# Patient Record
Sex: FEMALE | Race: WHITE | NOT HISPANIC OR LATINO | Employment: UNEMPLOYED | URBAN - METROPOLITAN AREA
[De-identification: names, ages, dates, MRNs, and addresses within clinical notes are randomized per-mention and may not be internally consistent; named-entity substitution may affect disease eponyms.]

---

## 2017-02-28 ENCOUNTER — NON-PROVIDER VISIT (OUTPATIENT)
Dept: URGENT CARE | Facility: CLINIC | Age: 37
End: 2017-02-28

## 2017-02-28 DIAGNOSIS — Z02.1 PRE-EMPLOYMENT DRUG SCREENING: ICD-10-CM

## 2017-02-28 LAB
AMP AMPHETAMINE: NORMAL
COC COCAINE: NORMAL
INT CON NEG: NORMAL
INT CON POS: NORMAL
OPI OPIATES: NORMAL
PCP PHENCYCLIDINE: NORMAL
POC DRUG COMMENT 753798-OCCUPATIONAL HEALTH: NEGATIVE
THC: NORMAL

## 2017-02-28 PROCEDURE — 80300 POCT 5 PANEL URINE DRUG SCREEN - INSTANT: CPT | Performed by: PHYSICIAN ASSISTANT

## 2017-12-15 DIAGNOSIS — Z01.812 PRE-OPERATIVE LABORATORY EXAMINATION: ICD-10-CM

## 2017-12-15 LAB — HCG SERPL QL: NEGATIVE

## 2017-12-15 PROCEDURE — 84703 CHORIONIC GONADOTROPIN ASSAY: CPT

## 2017-12-15 PROCEDURE — 36415 COLL VENOUS BLD VENIPUNCTURE: CPT

## 2017-12-15 RX ORDER — LAMOTRIGINE 100 MG/1
100 TABLET ORAL EVERY EVENING
COMMUNITY
End: 2018-01-30

## 2017-12-17 ENCOUNTER — HOSPITAL ENCOUNTER (OUTPATIENT)
Facility: MEDICAL CENTER | Age: 37
End: 2017-12-17
Attending: SPECIALIST | Admitting: SPECIALIST
Payer: MEDICAID

## 2017-12-17 VITALS
RESPIRATION RATE: 16 BRPM | OXYGEN SATURATION: 99 % | WEIGHT: 172.18 LBS | TEMPERATURE: 97.7 F | BODY MASS INDEX: 29.4 KG/M2 | HEIGHT: 64 IN | DIASTOLIC BLOOD PRESSURE: 75 MMHG | HEART RATE: 61 BPM | SYSTOLIC BLOOD PRESSURE: 120 MMHG

## 2017-12-17 LAB
HCG UR QL: NEGATIVE
SP GR UR REFRACTOMETRY: 1.02

## 2017-12-17 PROCEDURE — 160048 HCHG OR STATISTICAL LEVEL 1-5: Performed by: SPECIALIST

## 2017-12-17 PROCEDURE — 160009 HCHG ANES TIME/MIN: Performed by: SPECIALIST

## 2017-12-17 PROCEDURE — 500129 HCHG BOVIE, NEEDLE TIP 6 EXTEN: Performed by: SPECIALIST

## 2017-12-17 PROCEDURE — A9270 NON-COVERED ITEM OR SERVICE: HCPCS

## 2017-12-17 PROCEDURE — 81025 URINE PREGNANCY TEST: CPT

## 2017-12-17 PROCEDURE — 700102 HCHG RX REV CODE 250 W/ 637 OVERRIDE(OP)

## 2017-12-17 PROCEDURE — 160035 HCHG PACU - 1ST 60 MINS PHASE I: Performed by: SPECIALIST

## 2017-12-17 PROCEDURE — 502587 HCHG PACK, D&C: Performed by: SPECIALIST

## 2017-12-17 PROCEDURE — 160046 HCHG PACU - 1ST 60 MINS PHASE II: Performed by: SPECIALIST

## 2017-12-17 PROCEDURE — 160039 HCHG SURGERY MINUTES - EA ADDL 1 MIN LEVEL 3: Performed by: SPECIALIST

## 2017-12-17 PROCEDURE — 160025 RECOVERY II MINUTES (STATS): Performed by: SPECIALIST

## 2017-12-17 PROCEDURE — 500448 HCHG DRESSING, TELFA 3X4: Performed by: SPECIALIST

## 2017-12-17 PROCEDURE — 160028 HCHG SURGERY MINUTES - 1ST 30 MINS LEVEL 3: Performed by: SPECIALIST

## 2017-12-17 PROCEDURE — 88305 TISSUE EXAM BY PATHOLOGIST: CPT

## 2017-12-17 PROCEDURE — 700111 HCHG RX REV CODE 636 W/ 250 OVERRIDE (IP)

## 2017-12-17 PROCEDURE — 500123 HCHG BOVIE, CONTROL W/BLADE: Performed by: SPECIALIST

## 2017-12-17 PROCEDURE — 160002 HCHG RECOVERY MINUTES (STAT): Performed by: SPECIALIST

## 2017-12-17 PROCEDURE — 700101 HCHG RX REV CODE 250

## 2017-12-17 RX ORDER — ONDANSETRON 2 MG/ML
4 INJECTION INTRAMUSCULAR; INTRAVENOUS EVERY 6 HOURS PRN
Status: DISCONTINUED | OUTPATIENT
Start: 2017-12-17 | End: 2017-12-17 | Stop reason: HOSPADM

## 2017-12-17 RX ORDER — LIDOCAINE AND PRILOCAINE 25; 25 MG/G; MG/G
1 CREAM TOPICAL
Status: DISCONTINUED | OUTPATIENT
Start: 2017-12-17 | End: 2017-12-17 | Stop reason: HOSPADM

## 2017-12-17 RX ORDER — OXYCODONE HYDROCHLORIDE AND ACETAMINOPHEN 5; 325 MG/1; MG/1
1 TABLET ORAL EVERY 6 HOURS PRN
Status: DISCONTINUED | OUTPATIENT
Start: 2017-12-17 | End: 2017-12-17 | Stop reason: HOSPADM

## 2017-12-17 RX ORDER — SODIUM CHLORIDE, SODIUM LACTATE, POTASSIUM CHLORIDE, CALCIUM CHLORIDE 600; 310; 30; 20 MG/100ML; MG/100ML; MG/100ML; MG/100ML
INJECTION, SOLUTION INTRAVENOUS CONTINUOUS
Status: DISCONTINUED | OUTPATIENT
Start: 2017-12-17 | End: 2017-12-17 | Stop reason: HOSPADM

## 2017-12-17 RX ORDER — OXYCODONE HCL 5 MG/5 ML
SOLUTION, ORAL ORAL
Status: COMPLETED
Start: 2017-12-17 | End: 2017-12-17

## 2017-12-17 RX ORDER — LIDOCAINE HYDROCHLORIDE 10 MG/ML
0.5 INJECTION, SOLUTION INFILTRATION; PERINEURAL
Status: DISCONTINUED | OUTPATIENT
Start: 2017-12-17 | End: 2017-12-17 | Stop reason: HOSPADM

## 2017-12-17 RX ADMIN — SODIUM CHLORIDE, SODIUM LACTATE, POTASSIUM CHLORIDE, CALCIUM CHLORIDE: 600; 310; 30; 20 INJECTION, SOLUTION INTRAVENOUS at 12:42

## 2017-12-17 RX ADMIN — OXYCODONE HYDROCHLORIDE 10 MG: 5 SOLUTION ORAL at 15:30

## 2017-12-17 ASSESSMENT — PAIN SCALES - GENERAL
PAINLEVEL_OUTOF10: 0

## 2017-12-17 NOTE — OR NURSING
Some uncomfortable cramping. Respirations regular and easy. Scant vag discharge pink. Voided on bed pan. Very anxious.

## 2017-12-17 NOTE — DISCHARGE INSTRUCTIONS
ACTIVITY: Rest and take it easy for the first 24 hours.  A responsible adult is recommended to remain with you during that time.  It is normal to feel sleepy.  We encourage you to not do anything that requires balance, judgment or coordination.    MILD FLU-LIKE SYMPTOMS ARE NORMAL. YOU MAY EXPERIENCE GENERALIZED MUSCLE ACHES, THROAT IRRITATION, HEADACHE AND/OR SOME NAUSEA.    FOR 24 HOURS DO NOT:  Drive, operate machinery or run household appliances.  Drink beer or alcoholic beverages.   Make important decisions or sign legal documents.    SPECIAL INSTRUCTIONS: *follow up with Dr. Keating in 2 weeks.    DIET: To avoid nausea, slowly advance diet as tolerated, avoiding spicy or greasy foods for the first day.  Add more substantial food to your diet according to your physician's instructions.  Babies can be fed formula or breast milk as soon as they are hungry.  INCREASE FLUIDS AND FIBER TO AVOID CONSTIPATION.    SURGICAL DRESSING/BATHING: **no dressing.  May shower beginning tomorrow.    FOLLOW-UP APPOINTMENT:  A follow-up appointment should be arranged with your doctor.  Call to schedule.    You should CALL YOUR PHYSICIAN if you develop:  Fever greater than 101 degrees F.  Pain not relieved by medication, or persistent nausea or vomiting.  Excessive bleeding (blood soaking through dressing) or unexpected drainage from the wound.  Extreme redness or swelling around the incision site, drainage of pus or foul smelling drainage.  Inability to urinate or empty your bladder within 8 hours.  Problems with breathing or chest pain.    You should call 911 if you develop problems with breathing or chest pain.  If you are unable to contact your doctor or surgical center, you should go to the nearest emergency room or urgent care center.  Physician's telephone #: **Dr. Keating [phone]    If any questions arise, call your doctor.  If your doctor is not available, please feel free to call the Surgical Center at [phone].   The Center is open Monday through Friday from 7AM to 7PM.  You can also call the HEALTH HOTLINE open 24 hours/day, 7 days/week and speak to a nurse at [phone], or toll free at [phone].    A registered nurse may call you a few days after your surgery to see how you are doing after your procedure.    MEDICATIONS: Resume taking daily medication.  Take prescribed pain medication with food.  If no medication is prescribed, you may take non-aspirin pain medication if needed.  PAIN MEDICATION CAN BE VERY CONSTIPATING.  Take a stool softener or laxative such as senokot, pericolace, or milk of magnesia if needed.    No prescription given.  Last pain medication given at 3:30 pm.    If your physician has prescribed pain medication that includes Acetaminophen (Tylenol), do not take additional Acetaminophen (Tylenol) while taking the prescribed medication.    Depression / Suicide Risk    As you are discharged from this [facility] facility, it is important to learn how to keep safe from harming yourself.    Recognize the warning signs:  · Abrupt changes in personality, positive or negative- including increase in energy   · Giving away possessions  · Change in eating patterns- significant weight changes-  positive or negative  · Change in sleeping patterns- unable to sleep or sleeping all the time   · Unwillingness or inability to communicate  · Depression  · Unusual sadness, discouragement and loneliness  · Talk of wanting to die  · Neglect of personal appearance   · Rebelliousness- reckless behavior  · Withdrawal from people/activities they love  · Confusion- inability to concentrate     If you or a loved one observes any of these behaviors or has concerns about self-harm, here's what you can do:  · Talk about it- your feelings and reasons for harming yourself  · Remove any means that you might use to hurt yourself (examples: pills, rope, extension cords, firearm)  · Get professional help from the community  (Mental Health, Substance Abuse, psychological counseling)  · Do not be alone:Call your Safe Contact- someone whom you trust who will be there for you.  · Call your local CRISIS HOTLINE 711-6288 or 822-715-7378  · Call your local Children's Mobile Crisis Response Team [place] (525) 684-3685 or www.[employer]  · Call the toll free National Suicide Prevention Hotlines   · National Suicide Prevention Lifeline 385-736-CGFZ (0518)  · National Hope Line Network 800-SUICIDE (158-7751)

## 2017-12-17 NOTE — H&P
Korina Lyons          YOB: 1980  Date of today's procedure: Sunday, December 17, 2017  Facility: [facility]    ID: The patient is a very pleasant 37 year old multipara (para-2, and she has had 2 previous vaginal deliveries, and her daughters are 13 years old and 9 years old).    Chief Complaint: The patient has had complaints of abundant mucus vaginal discharge for months.    History of Present Illness: The patient came to see me in the office on December 12, 2017, 5 days ago.  This was her first and only visit with me.  She was referred to me by Dr. Kay Kaufman at [facility].  According to the records made available to me she was referred to me because of an abnormal Pap smear from June of this year and records made available to me reveal that her Pap smear in June of this year came back is atypical squamous cells of undetermined significance and that the report went on to say that the test for high-risk HPV was positive for evidence of high-risk HPV.  And so, when I saw her in the office on December 12, 2017 I attempted to perform a colposcopy.  However, I was not able to perform a colposcopy because (as speculum exam and bimanual exam revealed) a large (approximately 5 centimeters in diameter) mass in and arising from the 6 o’clock position of the cervix distorted the anatomy of the cervix and precluded colposcopy.  The speculum was removed and then I performed a very thorough bimanual vaginal exam and was able to palpate this discrete firm mass in and arising from the 6 o’clock position of the cervix.  There is concern (given the finding of this mass and given her symptoms of mucus vaginal discharge for several months and also given her symptoms of postcoital bleeding) of the possibility of cervical carcinoma.  And so she is today scheduled for biopsy of this cervical mass.  I have discussed with her and asked me to her in detail and at length what  biopsy of cervical mass in the operating room under anesthesia is and what biopsy of cervical mass in the operating room under general anesthesia involves and I have discussed with her and explained to her in detail and at length the risks and benefits and alternatives of biopsy of cervical mass in the operating room under general anesthesia.  After our discussions and after answering her questions she told me that she very much wishes for us to proceed with biopsy of cervical mass in the operating room under general anesthesia.    Past Medical History: The patient tells me that she has no medical illnesses.    Past Surgical History: The patient says that she has had no previous surgeries.    Medications: The patient says that she takes lamotrigine 100 milligrams daily.    Allergies: The patient says that she has no known drug allergies.    Social History: The patient tells me that she smokes about one half of a pack of cigarettes per day.  She says she only really consumes alcoholic beverages.  She denies the use of recreational drugs.    Review of Systems  General: The patient denies any fevers, chills, sweats.  Pulmonary: The patient denies any coughing, wheezing, chest pain, shortness of breath.  Cardiovascular: The patient denies any palpitations, dyspnea, chest pain.  Gastrointestinal: The patient denies any nausea, vomiting, diarrhea, constipation, hematochezia, melena, history of hepatitis, history of jaundice.  Genitourinary: The patient complains of mucus vaginal discharge for several months and also complains of postcoital bleeding.  She says that her menses are regular and that the amount of blood flow that she experiences with her menses is moderate.  She says she does experience some dyspareunia.  She says she also has some pelvic pain on related to menstruation.  Musculoskeletal: The patient denies any arthralgias or myalgias.   Neurological: No headaches or syncope or seizures.     Physical Exam:    Vital Signs: The patient's vital signs are stable and she is afebrile.  General: The patient appears well developed and well nourished and relaxed and alert and comfortable and in no apparent distress.    HEENT :  Normo-cephalic, atraumatic, pupils equal, round, reactive to light and accommodation, extra ocular motions intact, pharynx clear; there is no thyromegaly. There is no cervical lymphadenopathy.  Chest: Heart regular rate and rhythm, with no murmurs or rubs or gallops; the lungs are clear to auscultation bilaterally.  Abdomen: The abdomen is soft and flat and non-tender and non-distended. There is no hepatomegaly. There is no splenomegaly.   Pelvic: Speculum exam reveals a fairly large mass in and arising from the 6 o’clock position of the cervix.  The rest of the cervix appears normal.  There are no vulvar or vaginal lesions.  Bimanual exam does reveal a very firm mass in and arising from the 6 o’clock position of the cervix.  Bimanual exam reveals no evidence of enlargement of the uterine corpus and no evidence of tenderness to palpation of the uterine corpus and no evidence of any adnexal masses or tenderness either on the right or the left.  Extremities: No clubbing or cyanosis or edema.   Neurological: non-focal.     Assessment:   1.)  Abnormal Pap smear (atypical squamous of undetermined significance and the report went on to say that the test for high-risk HPV was positive for high-risk HPV) in June of this year.  2.)  Fairly large (5 centimeters in diameter) cervical mass in and arising from the 6 o’clock position of the cervix; her abnormal Pap smear and the finding of this mass in her symptoms of mucus vaginal discharge for several months of postcoital bleeding raises the suspicion of cervical carcinoma.    Plan:   We will proceed today with biopsy of cervical mass.  Please see above.          ________________________  Denis Keating M.D.

## 2017-12-23 NOTE — OP REPORT
DATE OF SERVICE:  12/17/2017    PREOPERATIVE DIAGNOSES:  1.  Abnormal Pap smear (atypical squamous cells of undetermined significance   and the report went on to say that the test for high risk HPV was positive for   high risk HPV) in June of this year.  2.  Fairly large (5 cm in diameter) cervical mass in and arising from the 6   o'clock position of the cervix; her abnormal Pap smear and the finding of this   mass and her symptoms of mucus, vaginal discharge for several months and her   symptoms of postcoital bleeding as well, raised the suspicion of cervical   carcinoma.    POSTOPERATIVE DIAGNOSES:  1.  Abnormal Pap smear (atypical squamous cells of undetermined significance   and the report went on to say that the test for high risk HPV was positive for   high risk HPV) in June of this year.  2.  Fairly large (5 cm in diameter) cervical mass in and arising from the 6   o'clock position of the cervix; her abnormal Pap smear and the finding of this   mass and her symptoms of mucus, vaginal discharge for several months and her   symptoms of postcoital bleeding as well, raised the suspicion of cervical   carcinoma.  3.  Pathology pending.    PROCEDURE:  Biopsy of cervical mass.    SURGEON:  Denis Keating MD    ANESTHESIA:  General.    ANESTHESIOLOGIST:  Fernando Rebolledo MD    FINDINGS:  Both speculum exam under anesthesia and also bimanual exam under   anesthesia revealed a cervical mass located in and coming from the 6 o'clock   position of the cervix.    SPECIMENS:  Biopsy of cervical mass.    COMPLICATIONS:  None.    ESTIMATED BLOOD LOSS:  Less than 30 mL.    DESCRIPTION OF PROCEDURE:  After appropriate consents have been obtained,   patient was taken to the operating room and given general anesthesia.  She was   prepped and draped in the dorsal lithotomy position.  After general   anesthesia was induced, but before the patient was prepped and draped in the   dorsal lithotomy position, a bimanual vaginal exam  was performed and reveals a   large, approximately 5 cm mass in and arising from the 6 o'clock position of   the cervix.  Bimanual exam reveals no evidence of enlargement of the uterine   corpus and no evidence of adnexal masses either on the right or the left.    After the patient was prepped and draped in the dorsal lithotomy position and   after the bladder was emptied, a speculum exam was performed and reveals a   large cervical mass in and coming from the 6 o'clock position of the cervix.    This mass was carefully examined and inspected.  The central portion of this   mass is grasped with a single tooth tenaculum and this area of this mass was   resected using a scalpel with a 10 blade.  The biopsy measures about 1 cm in   any/old dimensions.  The area of the cervical mass from which this biopsy was   taken was examined and bleeding was seen coming from this area and the entire   area was thoroughly cauterized with electrocautery.  Still some bleeding has   seen from this area and pressure was placed on this area for 5 minutes with a   gauze sponge.  The gauze sponge was removed and this area was examined, still   some bleeding seen coming from this area and further cauterization was   performed.  Still some bleeding was seen and pressure then was again placed   with a scopette covered with Monsel solution.  Pressure was placed for about 5   minutes.  The scopette with Monsel solution was then removed and the area was   examined and bleeding was found to have substantially decreased, but there   was still a small amount of bleeding.  Another scopette covered with more   Monsel's solution was applied to this area and pressure was placed again   against this area for another 5 minutes.  The scopette was removed and the   area was examined and no significant bleeding was seen at this time.  The   speculum was removed.  The procedure was terminated.  The patient tolerated   the procedure well and sent to  postanesthesia recovery in stable condition and   the procedure was terminated with lap counts reported to be correct at the   end of procedure.       ____________________________________     VITOR KAY MD    MED / NTS    DD:  12/23/2017 08:33:03  DT:  12/23/2017 09:08:07    D#:  [id number]  Job#:  [id number]    cc: JACQUIE MADERA MD

## 2018-01-16 ENCOUNTER — HOSPITAL ENCOUNTER (OUTPATIENT)
Dept: RADIOLOGY | Facility: MEDICAL CENTER | Age: 38
End: 2018-01-16
Attending: SPECIALIST
Payer: MEDICAID

## 2018-01-16 DIAGNOSIS — C53.9 MALIGNANT NEOPLASM OF CERVIX, UNSPECIFIED SITE (HCC): ICD-10-CM

## 2018-01-16 PROCEDURE — A9552 F18 FDG: HCPCS

## 2018-01-30 ENCOUNTER — HOSPITAL ENCOUNTER (OUTPATIENT)
Dept: RADIOLOGY | Facility: MEDICAL CENTER | Age: 38
DRG: 735 | End: 2018-01-30
Attending: SPECIALIST | Admitting: SPECIALIST
Payer: MEDICAID

## 2018-01-30 DIAGNOSIS — Z01.811 PRE-OPERATIVE RESPIRATORY EXAMINATION: ICD-10-CM

## 2018-01-30 DIAGNOSIS — Z01.812 PRE-PROCEDURAL LABORATORY EXAMINATION: ICD-10-CM

## 2018-01-30 LAB
ABO GROUP BLD: NORMAL
ALBUMIN SERPL BCP-MCNC: 4.7 G/DL (ref 3.2–4.9)
ALBUMIN/GLOB SERPL: 1.7 G/DL
ALP SERPL-CCNC: 61 U/L (ref 30–99)
ALT SERPL-CCNC: 13 U/L (ref 2–50)
ANION GAP SERPL CALC-SCNC: 8 MMOL/L (ref 0–11.9)
APTT PPP: 30.5 SEC (ref 24.7–36)
AST SERPL-CCNC: 22 U/L (ref 12–45)
BASOPHILS # BLD AUTO: 0.4 % (ref 0–1.8)
BASOPHILS # BLD: 0.04 K/UL (ref 0–0.12)
BILIRUB SERPL-MCNC: 0.7 MG/DL (ref 0.1–1.5)
BLD GP AB SCN SERPL QL: NORMAL
BUN SERPL-MCNC: 17 MG/DL (ref 8–22)
CALCIUM SERPL-MCNC: 9.1 MG/DL (ref 8.5–10.5)
CHLORIDE SERPL-SCNC: 103 MMOL/L (ref 96–112)
CO2 SERPL-SCNC: 24 MMOL/L (ref 20–33)
CREAT SERPL-MCNC: 0.79 MG/DL (ref 0.5–1.4)
EOSINOPHIL # BLD AUTO: 0.09 K/UL (ref 0–0.51)
EOSINOPHIL NFR BLD: 0.9 % (ref 0–6.9)
ERYTHROCYTE [DISTWIDTH] IN BLOOD BY AUTOMATED COUNT: 43.8 FL (ref 35.9–50)
GLOBULIN SER CALC-MCNC: 2.7 G/DL (ref 1.9–3.5)
GLUCOSE SERPL-MCNC: 74 MG/DL (ref 65–99)
HCG SERPL QL: NEGATIVE
HCT VFR BLD AUTO: 46.1 % (ref 37–47)
HGB BLD-MCNC: 15.7 G/DL (ref 12–16)
IMM GRANULOCYTES # BLD AUTO: 0.04 K/UL (ref 0–0.11)
IMM GRANULOCYTES NFR BLD AUTO: 0.4 % (ref 0–0.9)
INR PPP: 0.98 (ref 0.87–1.13)
LYMPHOCYTES # BLD AUTO: 2 K/UL (ref 1–4.8)
LYMPHOCYTES NFR BLD: 20 % (ref 22–41)
MCH RBC QN AUTO: 32.6 PG (ref 27–33)
MCHC RBC AUTO-ENTMCNC: 34.1 G/DL (ref 33.6–35)
MCV RBC AUTO: 95.8 FL (ref 81.4–97.8)
MONOCYTES # BLD AUTO: 0.94 K/UL (ref 0–0.85)
MONOCYTES NFR BLD AUTO: 9.4 % (ref 0–13.4)
NEUTROPHILS # BLD AUTO: 6.9 K/UL (ref 2–7.15)
NEUTROPHILS NFR BLD: 68.9 % (ref 44–72)
NRBC # BLD AUTO: 0 K/UL
NRBC BLD-RTO: 0 /100 WBC
PLATELET # BLD AUTO: 325 K/UL (ref 164–446)
PMV BLD AUTO: 9.4 FL (ref 9–12.9)
POTASSIUM SERPL-SCNC: 3.8 MMOL/L (ref 3.6–5.5)
PROT SERPL-MCNC: 7.4 G/DL (ref 6–8.2)
PROTHROMBIN TIME: 12.7 SEC (ref 12–14.6)
RBC # BLD AUTO: 4.81 M/UL (ref 4.2–5.4)
RH BLD: NORMAL
SODIUM SERPL-SCNC: 135 MMOL/L (ref 135–145)
WBC # BLD AUTO: 10 K/UL (ref 4.8–10.8)

## 2018-01-30 PROCEDURE — 85730 THROMBOPLASTIN TIME PARTIAL: CPT

## 2018-01-30 PROCEDURE — 85610 PROTHROMBIN TIME: CPT

## 2018-01-30 PROCEDURE — 86850 RBC ANTIBODY SCREEN: CPT

## 2018-01-30 PROCEDURE — 80053 COMPREHEN METABOLIC PANEL: CPT

## 2018-01-30 PROCEDURE — 36415 COLL VENOUS BLD VENIPUNCTURE: CPT

## 2018-01-30 PROCEDURE — 86900 BLOOD TYPING SEROLOGIC ABO: CPT

## 2018-01-30 PROCEDURE — 85025 COMPLETE CBC W/AUTO DIFF WBC: CPT

## 2018-01-30 PROCEDURE — 71046 X-RAY EXAM CHEST 2 VIEWS: CPT

## 2018-01-30 PROCEDURE — 86901 BLOOD TYPING SEROLOGIC RH(D): CPT

## 2018-01-30 PROCEDURE — 84703 CHORIONIC GONADOTROPIN ASSAY: CPT

## 2018-02-01 ENCOUNTER — HOSPITAL ENCOUNTER (INPATIENT)
Facility: MEDICAL CENTER | Age: 38
LOS: 1 days | DRG: 735 | End: 2018-02-02
Attending: SPECIALIST | Admitting: SPECIALIST
Payer: MEDICAID

## 2018-02-01 ENCOUNTER — APPOINTMENT (OUTPATIENT)
Dept: RADIOLOGY | Facility: MEDICAL CENTER | Age: 38
DRG: 735 | End: 2018-02-01
Attending: OBSTETRICS & GYNECOLOGY
Payer: MEDICAID

## 2018-02-01 ENCOUNTER — APPOINTMENT (OUTPATIENT)
Dept: RADIOLOGY | Facility: MEDICAL CENTER | Age: 38
DRG: 735 | End: 2018-02-01
Attending: SPECIALIST
Payer: MEDICAID

## 2018-02-01 DIAGNOSIS — G89.18 POST-OP PAIN: ICD-10-CM

## 2018-02-01 DIAGNOSIS — G89.18 POST-OPERATIVE PAIN: ICD-10-CM

## 2018-02-01 LAB
ANION GAP SERPL CALC-SCNC: 6 MMOL/L (ref 0–11.9)
BASOPHILS # BLD AUTO: 0.3 % (ref 0–1.8)
BASOPHILS # BLD: 0.06 K/UL (ref 0–0.12)
BUN SERPL-MCNC: 13 MG/DL (ref 8–22)
CALCIUM SERPL-MCNC: 7.3 MG/DL (ref 8.5–10.5)
CHLORIDE SERPL-SCNC: 107 MMOL/L (ref 96–112)
CO2 SERPL-SCNC: 21 MMOL/L (ref 20–33)
CREAT SERPL-MCNC: 0.86 MG/DL (ref 0.5–1.4)
EOSINOPHIL # BLD AUTO: 0 K/UL (ref 0–0.51)
EOSINOPHIL NFR BLD: 0 % (ref 0–6.9)
ERYTHROCYTE [DISTWIDTH] IN BLOOD BY AUTOMATED COUNT: 44 FL (ref 35.9–50)
GLUCOSE SERPL-MCNC: 129 MG/DL (ref 65–99)
HCT VFR BLD AUTO: 39.4 % (ref 37–47)
HGB BLD-MCNC: 12.6 G/DL (ref 12–16)
IMM GRANULOCYTES # BLD AUTO: 0.1 K/UL (ref 0–0.11)
IMM GRANULOCYTES NFR BLD AUTO: 0.5 % (ref 0–0.9)
LYMPHOCYTES # BLD AUTO: 0.45 K/UL (ref 1–4.8)
LYMPHOCYTES NFR BLD: 2.1 % (ref 22–41)
MCH RBC QN AUTO: 31.3 PG (ref 27–33)
MCHC RBC AUTO-ENTMCNC: 32 G/DL (ref 33.6–35)
MCV RBC AUTO: 98 FL (ref 81.4–97.8)
MONOCYTES # BLD AUTO: 0.97 K/UL (ref 0–0.85)
MONOCYTES NFR BLD AUTO: 4.5 % (ref 0–13.4)
NEUTROPHILS # BLD AUTO: 20.19 K/UL (ref 2–7.15)
NEUTROPHILS NFR BLD: 92.6 % (ref 44–72)
NRBC # BLD AUTO: 0 K/UL
NRBC BLD-RTO: 0 /100 WBC
PLATELET # BLD AUTO: 242 K/UL (ref 164–446)
PMV BLD AUTO: 8.6 FL (ref 9–12.9)
POTASSIUM SERPL-SCNC: 4.1 MMOL/L (ref 3.6–5.5)
RBC # BLD AUTO: 4.02 M/UL (ref 4.2–5.4)
SODIUM SERPL-SCNC: 134 MMOL/L (ref 135–145)
WBC # BLD AUTO: 21.8 K/UL (ref 4.8–10.8)

## 2018-02-01 PROCEDURE — 88309 TISSUE EXAM BY PATHOLOGIST: CPT

## 2018-02-01 PROCEDURE — A9270 NON-COVERED ITEM OR SERVICE: HCPCS

## 2018-02-01 PROCEDURE — 502240 HCHG MISC OR SUPPLY RC 0272: Performed by: SPECIALIST

## 2018-02-01 PROCEDURE — C2617 STENT, NON-COR, TEM W/O DEL: HCPCS | Performed by: SPECIALIST

## 2018-02-01 PROCEDURE — 500854 HCHG NEEDLE, INSUFFLATION FOR STEP: Performed by: SPECIALIST

## 2018-02-01 PROCEDURE — 85025 COMPLETE CBC W/AUTO DIFF WBC: CPT

## 2018-02-01 PROCEDURE — 160009 HCHG ANES TIME/MIN: Performed by: SPECIALIST

## 2018-02-01 PROCEDURE — 88307 TISSUE EXAM BY PATHOLOGIST: CPT

## 2018-02-01 PROCEDURE — 700101 HCHG RX REV CODE 250

## 2018-02-01 PROCEDURE — C1769 GUIDE WIRE: HCPCS | Performed by: SPECIALIST

## 2018-02-01 PROCEDURE — 88331 PATH CONSLTJ SURG 1 BLK 1SPC: CPT

## 2018-02-01 PROCEDURE — 36415 COLL VENOUS BLD VENIPUNCTURE: CPT

## 2018-02-01 PROCEDURE — 160036 HCHG PACU - EA ADDL 30 MINS PHASE I: Performed by: SPECIALIST

## 2018-02-01 PROCEDURE — 500301 HCHG CLIP, HEMOLOCK: Performed by: SPECIALIST

## 2018-02-01 PROCEDURE — 88332 PATH CONSLTJ SURG EA ADD BLK: CPT

## 2018-02-01 PROCEDURE — 502714 HCHG ROBOTIC SURGERY SERVICES: Performed by: SPECIALIST

## 2018-02-01 PROCEDURE — 770006 HCHG ROOM/CARE - MED/SURG/GYN SEMI*

## 2018-02-01 PROCEDURE — 700111 HCHG RX REV CODE 636 W/ 250 OVERRIDE (IP)

## 2018-02-01 PROCEDURE — 700102 HCHG RX REV CODE 250 W/ 637 OVERRIDE(OP): Performed by: OBSTETRICS & GYNECOLOGY

## 2018-02-01 PROCEDURE — 160031 HCHG SURGERY MINUTES - 1ST 30 MINS LEVEL 5: Performed by: SPECIALIST

## 2018-02-01 PROCEDURE — 501582 HCHG TROCAR, THRD BLADED: Performed by: SPECIALIST

## 2018-02-01 PROCEDURE — 74018 RADEX ABDOMEN 1 VIEW: CPT

## 2018-02-01 PROCEDURE — 0T788DZ DILATION OF BILATERAL URETERS WITH INTRALUMINAL DEVICE, VIA NATURAL OR ARTIFICIAL OPENING ENDOSCOPIC: ICD-10-PCS | Performed by: SPECIALIST

## 2018-02-01 PROCEDURE — 500864 HCHG NEEDLE, SPINAL 18G: Performed by: SPECIALIST

## 2018-02-01 PROCEDURE — 500700 HCHG HEMOCLIP, SMALL (RED): Performed by: SPECIALIST

## 2018-02-01 PROCEDURE — 700101 HCHG RX REV CODE 250: Performed by: OBSTETRICS & GYNECOLOGY

## 2018-02-01 PROCEDURE — 500880 HCHG PACK, CYSTO W/SEP LEGGINGS: Performed by: SPECIALIST

## 2018-02-01 PROCEDURE — 700101 HCHG RX REV CODE 250: Performed by: SPECIALIST

## 2018-02-01 PROCEDURE — 0US24ZZ REPOSITION BILATERAL OVARIES, PERCUTANEOUS ENDOSCOPIC APPROACH: ICD-10-PCS | Performed by: SPECIALIST

## 2018-02-01 PROCEDURE — 160042 HCHG SURGERY MINUTES - EA ADDL 1 MIN LEVEL 5: Performed by: SPECIALIST

## 2018-02-01 PROCEDURE — 07TC4ZZ RESECTION OF PELVIS LYMPHATIC, PERCUTANEOUS ENDOSCOPIC APPROACH: ICD-10-PCS | Performed by: SPECIALIST

## 2018-02-01 PROCEDURE — 160048 HCHG OR STATISTICAL LEVEL 1-5: Performed by: SPECIALIST

## 2018-02-01 PROCEDURE — 0UT9FZZ RESECTION OF UTERUS, VIA NATURAL OR ARTIFICIAL OPENING WITH PERCUTANEOUS ENDOSCOPIC ASSISTANCE: ICD-10-PCS | Performed by: SPECIALIST

## 2018-02-01 PROCEDURE — A9270 NON-COVERED ITEM OR SERVICE: HCPCS | Performed by: OBSTETRICS & GYNECOLOGY

## 2018-02-01 PROCEDURE — 0UT7FZZ RESECTION OF BILATERAL FALLOPIAN TUBES, VIA NATURAL OR ARTIFICIAL OPENING WITH PERCUTANEOUS ENDOSCOPIC ASSISTANCE: ICD-10-PCS | Performed by: SPECIALIST

## 2018-02-01 PROCEDURE — 80048 BASIC METABOLIC PNL TOTAL CA: CPT

## 2018-02-01 PROCEDURE — 160002 HCHG RECOVERY MINUTES (STAT): Performed by: SPECIALIST

## 2018-02-01 PROCEDURE — 160035 HCHG PACU - 1ST 60 MINS PHASE I: Performed by: SPECIALIST

## 2018-02-01 PROCEDURE — 700111 HCHG RX REV CODE 636 W/ 250 OVERRIDE (IP): Performed by: OBSTETRICS & GYNECOLOGY

## 2018-02-01 PROCEDURE — 501838 HCHG SUTURE GENERAL: Performed by: SPECIALIST

## 2018-02-01 PROCEDURE — 700102 HCHG RX REV CODE 250 W/ 637 OVERRIDE(OP)

## 2018-02-01 PROCEDURE — 501330 HCHG SET, CYSTO IRRIG TUBING: Performed by: SPECIALIST

## 2018-02-01 PROCEDURE — 502779 HCHG SUTURE, QUILL: Performed by: SPECIALIST

## 2018-02-01 PROCEDURE — 8E0W4CZ ROBOTIC ASSISTED PROCEDURE OF TRUNK REGION, PERCUTANEOUS ENDOSCOPIC APPROACH: ICD-10-PCS | Performed by: SPECIALIST

## 2018-02-01 DEVICE — STENT UROLOGICAL POLARIS 6X22  ULTRA: Type: IMPLANTABLE DEVICE | Status: FUNCTIONAL

## 2018-02-01 RX ORDER — ONDANSETRON 4 MG/1
4 TABLET, ORALLY DISINTEGRATING ORAL EVERY 4 HOURS PRN
Status: DISCONTINUED | OUTPATIENT
Start: 2018-02-01 | End: 2018-02-02 | Stop reason: HOSPADM

## 2018-02-01 RX ORDER — SODIUM CHLORIDE, SODIUM LACTATE, POTASSIUM CHLORIDE, CALCIUM CHLORIDE 600; 310; 30; 20 MG/100ML; MG/100ML; MG/100ML; MG/100ML
2000 INJECTION, SOLUTION INTRAVENOUS CONTINUOUS
Status: DISCONTINUED | OUTPATIENT
Start: 2018-02-01 | End: 2018-02-02 | Stop reason: HOSPADM

## 2018-02-01 RX ORDER — MIDAZOLAM HYDROCHLORIDE 1 MG/ML
INJECTION INTRAMUSCULAR; INTRAVENOUS
Status: DISPENSED
Start: 2018-02-01 | End: 2018-02-01

## 2018-02-01 RX ORDER — DEXTROSE MONOHYDRATE, SODIUM CHLORIDE, AND POTASSIUM CHLORIDE 50; 1.49; 4.5 G/1000ML; G/1000ML; G/1000ML
INJECTION, SOLUTION INTRAVENOUS
Status: COMPLETED
Start: 2018-02-01 | End: 2018-02-01

## 2018-02-01 RX ORDER — ACETAMINOPHEN 500 MG
TABLET ORAL
Status: DISPENSED
Start: 2018-02-01 | End: 2018-02-01

## 2018-02-01 RX ORDER — LAMOTRIGINE 100 MG/1
100 TABLET ORAL DAILY
COMMUNITY
End: 2018-02-11

## 2018-02-01 RX ORDER — ONDANSETRON 2 MG/ML
4 INJECTION INTRAMUSCULAR; INTRAVENOUS EVERY 6 HOURS PRN
Status: DISCONTINUED | OUTPATIENT
Start: 2018-02-01 | End: 2018-02-02 | Stop reason: HOSPADM

## 2018-02-01 RX ORDER — CELECOXIB 200 MG/1
CAPSULE ORAL
Status: DISPENSED
Start: 2018-02-01 | End: 2018-02-01

## 2018-02-01 RX ORDER — LIDOCAINE HYDROCHLORIDE 10 MG/ML
0.5 INJECTION, SOLUTION INFILTRATION; PERINEURAL
Status: ACTIVE | OUTPATIENT
Start: 2018-02-01 | End: 2018-02-02

## 2018-02-01 RX ORDER — DEXTROSE MONOHYDRATE, SODIUM CHLORIDE, AND POTASSIUM CHLORIDE 50; 1.49; 4.5 G/1000ML; G/1000ML; G/1000ML
INJECTION, SOLUTION INTRAVENOUS CONTINUOUS
Status: DISCONTINUED | OUTPATIENT
Start: 2018-02-01 | End: 2018-02-02 | Stop reason: HOSPADM

## 2018-02-01 RX ORDER — GABAPENTIN 300 MG/1
CAPSULE ORAL
Status: DISPENSED
Start: 2018-02-01 | End: 2018-02-01

## 2018-02-01 RX ORDER — BUPIVACAINE HYDROCHLORIDE AND EPINEPHRINE 2.5; 5 MG/ML; UG/ML
INJECTION, SOLUTION EPIDURAL; INFILTRATION; INTRACAUDAL; PERINEURAL
Status: DISCONTINUED | OUTPATIENT
Start: 2018-02-01 | End: 2018-02-01 | Stop reason: HOSPADM

## 2018-02-01 RX ORDER — HYDROMORPHONE HYDROCHLORIDE 2 MG/ML
INJECTION, SOLUTION INTRAMUSCULAR; INTRAVENOUS; SUBCUTANEOUS
Status: COMPLETED
Start: 2018-02-01 | End: 2018-02-01

## 2018-02-01 RX ORDER — SODIUM CHLORIDE, SODIUM LACTATE, POTASSIUM CHLORIDE, CALCIUM CHLORIDE 600; 310; 30; 20 MG/100ML; MG/100ML; MG/100ML; MG/100ML
INJECTION, SOLUTION INTRAVENOUS CONTINUOUS
Status: DISCONTINUED | OUTPATIENT
Start: 2018-02-01 | End: 2018-02-02 | Stop reason: HOSPADM

## 2018-02-01 RX ORDER — LIDOCAINE AND PRILOCAINE 25; 25 MG/G; MG/G
1 CREAM TOPICAL
Status: ACTIVE | OUTPATIENT
Start: 2018-02-01 | End: 2018-02-02

## 2018-02-01 RX ORDER — LIDOCAINE HYDROCHLORIDE 10 MG/ML
INJECTION, SOLUTION INFILTRATION; PERINEURAL
Status: COMPLETED
Start: 2018-02-01 | End: 2018-02-01

## 2018-02-01 RX ORDER — OXYCODONE AND ACETAMINOPHEN 7.5; 325 MG/1; MG/1
1-2 TABLET ORAL EVERY 6 HOURS PRN
Status: DISCONTINUED | OUTPATIENT
Start: 2018-02-01 | End: 2018-02-02

## 2018-02-01 RX ADMIN — FENTANYL CITRATE 50 MCG: 50 INJECTION, SOLUTION INTRAMUSCULAR; INTRAVENOUS at 16:50

## 2018-02-01 RX ADMIN — LIDOCAINE HYDROCHLORIDE 0.5 ML: 10 INJECTION, SOLUTION INFILTRATION; PERINEURAL at 08:45

## 2018-02-01 RX ADMIN — SODIUM CHLORIDE, SODIUM LACTATE, POTASSIUM CHLORIDE, CALCIUM CHLORIDE: 600; 310; 30; 20 INJECTION, SOLUTION INTRAVENOUS at 08:48

## 2018-02-01 RX ADMIN — POTASSIUM CHLORIDE, DEXTROSE MONOHYDRATE AND SODIUM CHLORIDE 1000 ML: 150; 5; 450 INJECTION, SOLUTION INTRAVENOUS at 18:05

## 2018-02-01 RX ADMIN — POTASSIUM CHLORIDE, DEXTROSE MONOHYDRATE AND SODIUM CHLORIDE: 150; 5; 450 INJECTION, SOLUTION INTRAVENOUS at 23:00

## 2018-02-01 RX ADMIN — OXYCODONE HYDROCHLORIDE AND ACETAMINOPHEN 2 TABLET: 7.5; 325 TABLET ORAL at 20:11

## 2018-02-01 RX ADMIN — ONDANSETRON 4 MG: 2 INJECTION INTRAMUSCULAR; INTRAVENOUS at 18:28

## 2018-02-01 ASSESSMENT — PAIN SCALES - GENERAL
PAINLEVEL_OUTOF10: 0
PAINLEVEL_OUTOF10: 0
PAINLEVEL_OUTOF10: 3
PAINLEVEL_OUTOF10: 5
PAINLEVEL_OUTOF10: 5
PAINLEVEL_OUTOF10: 3

## 2018-02-01 ASSESSMENT — LIFESTYLE VARIABLES: DO YOU DRINK ALCOHOL: NO

## 2018-02-02 VITALS
SYSTOLIC BLOOD PRESSURE: 108 MMHG | DIASTOLIC BLOOD PRESSURE: 62 MMHG | BODY MASS INDEX: 30.15 KG/M2 | TEMPERATURE: 98.1 F | RESPIRATION RATE: 18 BRPM | OXYGEN SATURATION: 97 % | HEART RATE: 79 BPM | HEIGHT: 64 IN | WEIGHT: 176.59 LBS

## 2018-02-02 LAB
ANION GAP SERPL CALC-SCNC: 7 MMOL/L (ref 0–11.9)
BASOPHILS # BLD AUTO: 0.1 % (ref 0–1.8)
BASOPHILS # BLD: 0.01 K/UL (ref 0–0.12)
BUN SERPL-MCNC: 8 MG/DL (ref 8–22)
CALCIUM SERPL-MCNC: 8.2 MG/DL (ref 8.5–10.5)
CHLORIDE SERPL-SCNC: 110 MMOL/L (ref 96–112)
CO2 SERPL-SCNC: 22 MMOL/L (ref 20–33)
CREAT SERPL-MCNC: 0.86 MG/DL (ref 0.5–1.4)
EOSINOPHIL # BLD AUTO: 0.01 K/UL (ref 0–0.51)
EOSINOPHIL NFR BLD: 0.1 % (ref 0–6.9)
ERYTHROCYTE [DISTWIDTH] IN BLOOD BY AUTOMATED COUNT: 44.2 FL (ref 35.9–50)
GLUCOSE SERPL-MCNC: 106 MG/DL (ref 65–99)
HCT VFR BLD AUTO: 39.2 % (ref 37–47)
HGB BLD-MCNC: 12.8 G/DL (ref 12–16)
IMM GRANULOCYTES # BLD AUTO: 0.04 K/UL (ref 0–0.11)
IMM GRANULOCYTES NFR BLD AUTO: 0.3 % (ref 0–0.9)
LYMPHOCYTES # BLD AUTO: 1.37 K/UL (ref 1–4.8)
LYMPHOCYTES NFR BLD: 10.3 % (ref 22–41)
MCH RBC QN AUTO: 31.9 PG (ref 27–33)
MCHC RBC AUTO-ENTMCNC: 32.7 G/DL (ref 33.6–35)
MCV RBC AUTO: 97.8 FL (ref 81.4–97.8)
MONOCYTES # BLD AUTO: 1.46 K/UL (ref 0–0.85)
MONOCYTES NFR BLD AUTO: 10.9 % (ref 0–13.4)
NEUTROPHILS # BLD AUTO: 10.45 K/UL (ref 2–7.15)
NEUTROPHILS NFR BLD: 78.3 % (ref 44–72)
NRBC # BLD AUTO: 0 K/UL
NRBC BLD-RTO: 0 /100 WBC
PLATELET # BLD AUTO: 261 K/UL (ref 164–446)
PMV BLD AUTO: 8.9 FL (ref 9–12.9)
POTASSIUM SERPL-SCNC: 4.1 MMOL/L (ref 3.6–5.5)
RBC # BLD AUTO: 4.01 M/UL (ref 4.2–5.4)
SODIUM SERPL-SCNC: 139 MMOL/L (ref 135–145)
WBC # BLD AUTO: 13.3 K/UL (ref 4.8–10.8)

## 2018-02-02 PROCEDURE — 700102 HCHG RX REV CODE 250 W/ 637 OVERRIDE(OP): Performed by: NURSE PRACTITIONER

## 2018-02-02 PROCEDURE — A9270 NON-COVERED ITEM OR SERVICE: HCPCS | Performed by: NURSE PRACTITIONER

## 2018-02-02 PROCEDURE — 700111 HCHG RX REV CODE 636 W/ 250 OVERRIDE (IP): Performed by: SPECIALIST

## 2018-02-02 PROCEDURE — 700102 HCHG RX REV CODE 250 W/ 637 OVERRIDE(OP): Performed by: SPECIALIST

## 2018-02-02 PROCEDURE — 700101 HCHG RX REV CODE 250: Performed by: OBSTETRICS & GYNECOLOGY

## 2018-02-02 PROCEDURE — 85025 COMPLETE CBC W/AUTO DIFF WBC: CPT

## 2018-02-02 PROCEDURE — 36415 COLL VENOUS BLD VENIPUNCTURE: CPT

## 2018-02-02 PROCEDURE — A9270 NON-COVERED ITEM OR SERVICE: HCPCS | Performed by: SPECIALIST

## 2018-02-02 PROCEDURE — 80048 BASIC METABOLIC PNL TOTAL CA: CPT

## 2018-02-02 PROCEDURE — A9270 NON-COVERED ITEM OR SERVICE: HCPCS | Performed by: OBSTETRICS & GYNECOLOGY

## 2018-02-02 PROCEDURE — 700102 HCHG RX REV CODE 250 W/ 637 OVERRIDE(OP): Performed by: OBSTETRICS & GYNECOLOGY

## 2018-02-02 RX ORDER — OXYCODONE AND ACETAMINOPHEN 7.5; 325 MG/1; MG/1
1-2 TABLET ORAL EVERY 4 HOURS PRN
Status: DISCONTINUED | OUTPATIENT
Start: 2018-02-02 | End: 2018-02-02 | Stop reason: HOSPADM

## 2018-02-02 RX ORDER — KETOROLAC TROMETHAMINE 30 MG/ML
30 INJECTION, SOLUTION INTRAMUSCULAR; INTRAVENOUS EVERY 6 HOURS PRN
Status: DISCONTINUED | OUTPATIENT
Start: 2018-02-02 | End: 2018-02-02 | Stop reason: HOSPADM

## 2018-02-02 RX ORDER — OXYCODONE AND ACETAMINOPHEN 7.5; 325 MG/1; MG/1
1-2 TABLET ORAL EVERY 6 HOURS PRN
Qty: 60 TAB | Refills: 0 | Status: SHIPPED | OUTPATIENT
Start: 2018-02-02 | End: 2018-02-11

## 2018-02-02 RX ORDER — METOCLOPRAMIDE 10 MG/1
10 TABLET ORAL EVERY 6 HOURS
Qty: 30 TAB | Refills: 0 | Status: SHIPPED | OUTPATIENT
Start: 2018-02-02 | End: 2018-02-11

## 2018-02-02 RX ORDER — ONDANSETRON 4 MG/1
4 TABLET, ORALLY DISINTEGRATING ORAL EVERY 6 HOURS PRN
Qty: 30 TAB | Refills: 0 | Status: SHIPPED | OUTPATIENT
Start: 2018-02-02 | End: 2018-02-11

## 2018-02-02 RX ORDER — METOCLOPRAMIDE 10 MG/1
10 TABLET ORAL EVERY 6 HOURS
Status: DISCONTINUED | OUTPATIENT
Start: 2018-02-02 | End: 2018-02-02 | Stop reason: HOSPADM

## 2018-02-02 RX ORDER — METOCLOPRAMIDE 10 MG/1
10 TABLET ORAL EVERY 6 HOURS
Qty: 30 TAB | Refills: 0 | Status: SHIPPED | OUTPATIENT
Start: 2018-02-02 | End: 2018-02-02

## 2018-02-02 RX ORDER — ONDANSETRON 4 MG/1
4 TABLET, ORALLY DISINTEGRATING ORAL EVERY 6 HOURS PRN
Qty: 30 TAB | Refills: 0 | Status: SHIPPED | OUTPATIENT
Start: 2018-02-02 | End: 2018-02-02

## 2018-02-02 RX ADMIN — POTASSIUM CHLORIDE, DEXTROSE MONOHYDRATE AND SODIUM CHLORIDE: 150; 5; 450 INJECTION, SOLUTION INTRAVENOUS at 05:48

## 2018-02-02 RX ADMIN — POTASSIUM CHLORIDE, DEXTROSE MONOHYDRATE AND SODIUM CHLORIDE: 150; 5; 450 INJECTION, SOLUTION INTRAVENOUS at 04:25

## 2018-02-02 RX ADMIN — OXYCODONE HYDROCHLORIDE AND ACETAMINOPHEN 2 TABLET: 7.5; 325 TABLET ORAL at 02:46

## 2018-02-02 RX ADMIN — METOCLOPRAMIDE HYDROCHLORIDE 10 MG: 10 TABLET ORAL at 10:55

## 2018-02-02 RX ADMIN — OXYCODONE HYDROCHLORIDE AND ACETAMINOPHEN 2 TABLET: 7.5; 325 TABLET ORAL at 13:34

## 2018-02-02 RX ADMIN — KETOROLAC TROMETHAMINE 30 MG: 30 INJECTION, SOLUTION INTRAMUSCULAR at 10:54

## 2018-02-02 RX ADMIN — OXYCODONE HYDROCHLORIDE AND ACETAMINOPHEN 2 TABLET: 7.5; 325 TABLET ORAL at 09:10

## 2018-02-02 ASSESSMENT — PATIENT HEALTH QUESTIONNAIRE - PHQ9
2. FEELING DOWN, DEPRESSED, IRRITABLE, OR HOPELESS: NOT AT ALL
SUM OF ALL RESPONSES TO PHQ9 QUESTIONS 1 AND 2: 0
SUM OF ALL RESPONSES TO PHQ QUESTIONS 1-9: 0
1. LITTLE INTEREST OR PLEASURE IN DOING THINGS: NOT AT ALL

## 2018-02-02 ASSESSMENT — PAIN SCALES - GENERAL
PAINLEVEL_OUTOF10: 4
PAINLEVEL_OUTOF10: 7

## 2018-02-02 ASSESSMENT — ENCOUNTER SYMPTOMS
FEVER: 0
SHORTNESS OF BREATH: 0
CHILLS: 0
NAUSEA: 0
VOMITING: 0

## 2018-02-02 ASSESSMENT — LIFESTYLE VARIABLES: DO YOU DRINK ALCOHOL: NO

## 2018-02-02 NOTE — PROGRESS NOTES
PT received to room #173 @ 4510.  VSS, complaining of nausea zofran given, no complaints as of now

## 2018-02-02 NOTE — PROGRESS NOTES
Gynecology Oncology Progress Note               Author: Joann Johnson Date & Time created: 2/2/2018  9:32 AM     Interval History:  Patient feeling fair this am, having pain before next dose of prn medication available.  Berman to dd clear urine.    Review of Systems:  Review of Systems   Constitutional: Negative for chills and fever.   Respiratory: Negative for shortness of breath.    Cardiovascular: Negative for chest pain.   Gastrointestinal: Negative for nausea and vomiting.       Physical Exam:  Physical Exam   Constitutional: She is oriented to person, place, and time. She appears well-developed. No distress.   Pulmonary/Chest: Effort normal. No respiratory distress.   Abdominal: Soft.   Neurological: She is alert and oriented to person, place, and time.   Psychiatric: She has a normal mood and affect.       Labs:        Invalid input(s): [id number]      Recent Labs      01/30/18   1313  02/01/18   1721   SODIUM  135  134*   POTASSIUM  3.8  4.1   CHLORIDE  103  107   CO2  24  21   BUN  17  13   CREATININE  0.79  0.86   CALCIUM  9.1  7.3*     Recent Labs      01/30/18   1313  02/01/18   1721   ALTSGPT  13   --    ASTSGOT  22   --    ALKPHOSPHAT  61   --    TBILIRUBIN  0.7   --    GLUCOSE  74  129*     Recent Labs      01/30/18   1313  02/01/18   1721   RBC  4.81  4.02*   HEMOGLOBIN  15.7  12.6   HEMATOCRIT  46.1  39.4   PLATELETCT  325  242   PROTHROMBTM  12.7   --    APTT  30.5   --    INR  0.98   --      Recent Labs      01/30/18   1313  02/01/18   1721   WBC  10.0  21.8*   NEUTSPOLYS  68.90  92.60*   LYMPHOCYTES  20.00*  2.10*   MONOCYTES  9.40  4.50   EOSINOPHILS  0.90  0.00   BASOPHILS  0.40  0.30   ASTSGOT  22   --    ALTSGPT  13   --    ALKPHOSPHAT  61   --    TBILIRUBIN  0.7   --      Recent Labs      01/30/18   1313  02/01/18   1721   SODIUM  135  134*   POTASSIUM  3.8  4.1   CHLORIDE  103  107   CO2  24  21   GLUCOSE  74  129*   BUN  17  13   CREATININE  0.79  0.86   CALCIUM  9.1  7.3*      Hemodynamics:  Temp (24hrs), Av.8 °C (98.2 °F), Min:36.6 °C (97.8 °F), Max:37.2 °C (98.9 °F)  Temperature: 36.7 °C (98.1 °F)  Pulse  Av.2  Min: 65  Max: 108Heart Rate (Monitored): 67  Blood Pressure: 108/62, NIBP: 116/79     Respiratory:    Respiration: 18, Pulse Oximetry: 97 %           Fluids:    Intake/Output Summary (Last 24 hours) at 18 0932  Last data filed at 18 0741   Gross per 24 hour   Intake             7740 ml   Output             4220 ml   Net             3520 ml        GI/Nutrition:  Orders Placed This Encounter   Procedures   • DIET ORDER     Standing Status:   Standing     Number of Occurrences:   1     Order Specific Question:   Diet:     Answer:   Regular [1]     Medical Decision Making, by Problem:  There are no active hospital problems to display for this patient.      Plan:  POD #1 Robotic radical hysterectomy with bilateral robotic nerve sparing radical   hysterectomy with bilateral salpingectomy oophoropexy. Cystoscopy with bilateral double-J ureteral stent placement. On 2018.    1. Hematuria- resolving, please discontinue palma catheter.    2. Postoperative pain- patient states pain begins to increase before next dose of medication due, prn medication changed to q4h prn.   If patient able to void without difficulty, pain well controlled and tolerating regular diet will dc home.      Quality-Core Measures

## 2018-02-02 NOTE — OR SURGEON
Immediate Post OP Note    PreOp Diagnosis: IB2 Cervical cancer    PostOp Diagnosis: Same    Procedure(s):  HYSTERECTOMY ROBOTIC XI- RADICAL - Wound Class: Clean Contaminated  SALPINGECTOMY - Wound Class: Clean Contaminated  OOPHOROPEXY - Wound Class: Clean Contaminated  CYSTOSCOPY STENT PLACEMENT- URETERAL - Wound Class: Clean Contaminated    Surgeon(s):  RIGOBERTO Akhtar M.D.    Anesthesiologist/Type of Anesthesia:  Anesthesiologist: Matt Dugan M.D.; Eldon Reyna M.D./General    Surgical Staff:  Circulator: Christiana Rosa R.N.  Relief Circulator: Junior Martin R.N.  Scrub Person: Zamzam Madera; Izzy Hinkle    Specimens:  * No specimens in log *    Estimated Blood Loss: 100    Findings: 5 cm lesion on the posterior cervical lip. No evidence of upper vaginal extension and no evidence of metastatic spread within the peritoneal cavity, normal right and left tubes.     Complications: None        2/1/2018 4:16 PM Roni Kolb

## 2018-02-02 NOTE — OP REPORT
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    PROCEDURE PERFORMED:  1.  Robotic radical hysterectomy with bilateral robotic nerve sparing radical   hysterectomy with bilateral salpingectomy.  2.  Cystoscopy with bilateral double-J ureteral stent placement 6x22.  3.  Bilateral oophoropexy.    SURGEON:  Tito Jensen MD    ASSISTANT:  Roni Kolb M.D.    ANESTHESIA:  General.    ANESTHESIOLOGIST:  Eldon Reyna MD    ESTIMATED BLOOD LOSS:  100 mL    FLUIDS:  Per Dr. Eldon Reyna.    URINE OUTPUT:  As per Dr. Eldon Reyna.    COMPLICATIONS:  None.    COUNTS:  Final sponge and needle counts correct.    INDICATIONS FOR SURGERY:  The patient is a 37-year-old female, recently   diagnosed with a cervical cancer.  She has a 6 cm exophytic tumor centrally.    Patient was seen by me for consultation.  Patient was offered concurrent   chemoradiation therapy versus surgical resection.  The patient elected to   proceed with surgical resection.  Risks, benefits, and rationale of these   procedures were reviewed with the patient in detail.  Patient is understanding   of these risks and wished to proceed with the surgery as planned.    Patient required bilateral ureteral stents due to the fact that this was a big   tumor and ureteral dissection had to be performed.  The ureteral stents were   placed in order to minimize potential injury to the ureter.    INTRAOPERATIVE FINDINGS:  1.  No evidence of ascites.  2.  Normal right and left diaphragm.  Liver capsule is smooth.  Stomach   appeared grossly normal.  Abdominal peritoneal surfaces were unremarkable.    Omentum appeared grossly normal.  3.  In the pelvis, uterus was of normal size.  The adnexal structures were   unremarkable.  There was no seeding along the bladder, pelvic, and cul-de-sac   peritoneum.  The  parametria were clear.    Examination under anesthesia revealed a posterior 6 cm tumor that is central.    Vaginal fornices are clear.  There is no evidence of disease in the   parametria.    PROCEDURE NOTE:  We initially began with a cystoscopy.  A 30-degree cystoscope   was inserted transurethrally.  The right and left ureteral orifices were   easily located.  A guidewire was easily floated without any difficulty.  A   6x22 double-J ureteral stent was subsequently placed without incident.  After   completion of this, the bladder was emptied.  Berman catheter was placed.  I   then turned my focus towards the robotic portion of the procedure.    A 1 cm supraumbilical incision was made.  A Veress needle was inserted without   difficulty.  Pneumoperitoneum was achieved to the abdominal pressure of 15   mmHg.  A 8 mm trocar was inserted without difficulty.     After completion of this, a laparoscope was then placed in the   left lower quadrant port to assist in the placement of the remainder of the   da Jocy ports.  Two 8 mm ports were placed in the right upper quadrant 8 cm   apart while one 8 mm port was placed in the left upper quadrant 8 cm apart.    After completion of this, the patient was placed in steep Trendelenburg   position.  The robotic system was then docked and after docking the robotic   system, the instrumentation was inserted under direct laparoscopic   visualization to insure that there was no injury to the abdominal contents.    Once this was completed, the robotic camera was then docked.  We then   proceeded with our da Jocy portion of the procedure.    Initially, began with the right posterior broadleaf ligament, this was   incised.  The right ureter was identified.  The right and left uteroovarian ligament were  skeletonized and isolated.  Bipolar cautery was used to cauterize the uteroovarian   ligament and subsequently divided.  The perirectal spaces were then developed.    The round ligament  and was then incised followed by anterior broad leaf   ligament.  The perivesicular space was then developed.  I then proceeded along   with pelvic lymphadenectomy.  All the lymph node bearing tissues along the   external iliac artery and vein were subsequently skeletonized off the vessels   and dissected.  The genitofemoral nerve along with the ilioinguinal nerve were   identified and preserved. The lymph node bearing tissues interposed between   the external iliac vein and the psoas muscle were then mobilized into the   obturator fossa.  All the lymph node bearing tissues along the obturator fossa   were skeletonized off the accessory obturator vein, obturator nerve, artery   and vein after identifying the pertinent structures and resected without   compromising these pertinent structures.  All the lymph node bearing tissues   lateral to the common iliac vessels between the psoas muscle was also   resected.  After completion of this, the hypogastric lymph nodes were also   dissected and in the process the anterior and posterior division of   hypogastric vessels were skeletonized along with the uterine artery and vein   and the superior vesicle artery which were identified.  In the process the   hypogastric lymph nodes were resected.  After completion of this, the   parametria was developed.  Following completion of this, I then divided the   uterine artery at its origin. After completion of this, using the vessel   sealer, I then subsequently skeletonized the lower parametria, and the   inferior branch of the uterine artery and vein was subsequently divided and   the parametria was divided down to the level of the pelvic floor.  The pelvic   splenic nerves were identified and preserved.  After completion of this, I   then turned my focus towards the right ureter.  The right ureter was dissected   off the medial leaf of the peritoneum.  After completion of this, 1 cm   inferior to the ureter, I then dissected the  fibrofatty tissue along with the   neurovascular bundle of the S2, S3, S4 nerves, and the mesoureter was   dissected off the medial leaf of the peritoneum and mobilized laterally.    After completion of this, I then took down the bladder flap.  In the process   of taking down the bladder flap after completion of this, I then turned my   focus towards the left side.  Again, similar dissection was performed on the   contralateral side, first initially incising the left posterior broadleaf   ligament.  The left ureter was identified.  The left ovarian vessels were   skeletonized and isolated.  Bipolar cautery was used to cauterize the ovarian   vessels.  The medial leaf of the peritoneum was incised down to the level of   the uterosacral ligament.  The left round ligament followed by left anterior   broadleaf ligament was divided.  Perivesicular and perirectal spaces were   developed and created.  Following completion of this, the left pelvic lymph   node dissection was undertaken, again, similar to the right pelvic nodes,   removing all the lymph node bearing tissues along the external iliac artery,   vein including hypogastric lymph nodes.  In the process of this, the   genitofemoral nerve along with the ilioinguinal nerve and the obturator nerve,   artery, and vein were all preserved. The boundary of the pelvic node   dissection bilaterally were distally the circumflex iliac vein, laterally the   psoas muscle and medially the superior vesicle artery and ureter and   inferiorly to the level of the pelvic floor.    After completion of this, the left hypogastric lymph nodes were also dissected   and in the process the left anterior and posterior division of hypogastric   vessels were skeletonized along with the left uterine artery and vein and the   left superior vesicle artery which were identified.  In the process the left   hypogastric lymph nodes were resected.  After completion of this, the left   parametria was  developed.  Following completion of this, I then divided the   left uterine artery at its origin.  After completion of this, using the vessel   sealer, I then subsequently skeletonized the left lower parametria, and the   left inferior branch of the uterine artery and vein was subsequently divided   and the left parametria was divided down to the level of the pelvic floor.    The left pelvic splenic nerves were identified and preserved.  After   completion of this, I then turned my focus towards the left ureter. The left   ureter was dissected off the medial leaf of the peritoneum. After completion   of this, 1 cm inferior to the ureter, I then dissected the fibrofatty tissue   along with the neurovascular bundle of the S2, S3, S4 nerves, and the left   mesoureter was dissected off the medial leaf of the peritoneum and mobilized   laterally.    After completion of this, I then proceeded along with the ureteric unroofing.    Using the vessel sealer, the anterior leaf of the uterovesicular ligament was   unroofed, mobilizing it superiorly. The ureter was clearly identified.  The   anterior uterovesicular ligament was divided with the vessel sealer without   compromising the stent to ureter.  The lateral ligament was then subsequently   divided.  After completion of this, I then identified the mesoureter inferior   to the ureter, this neurovascular bundle was followed into the posterior   aspect of the uterovesicular ligament.  I  the hypogastric nerve   plexus intervening the cervix from the bladder.  After completion of this, I   then divided the nerve fibers to the cervix, with the vessel sealer, thus   releasing the lower end of the parametria.  Similar dissection was performed   on the left ureter.  After completion of this, the uterosacral ligaments were   then skeletonized and isolated.  The rectovaginal septum was then developed.    After completion of this, the uterosacral ligament was divided with  the vessel   sealer.  We further dissected the bladder away from paracervical fascia after   at least 1-2 cm of the vaginal margin was obtained and anterior colpotomy was   made.  The vagina was circumferentially incised to complete the radical   nerve-sparing hysterectomy. The specimen was removed through the vaginal canal.    The vaginal cuff was then closed with 0 Quill PDS suture in a 2-layer standard  closure fashion.  After completion of this, the cystotomy was enclosed with 2-layer 2-0 Vicryl suture.   After completion of this, the right and left ovary which had been skeletonized  were then suspended to the right and left pericolic gutter with 2-0 Vicryl suture.  After completion of this, we then copiously irrigated the pelvis with water.    Hemostasis was established.  The integrity of the ureters were intact.  Once   this was established, we counted for sponges, needles, and instrument counts.      Once this was accounted for, robotic instrumentation was removed. Robotic   system was then de-docked. Pneumoperitoneum was allowed to escape through   the 8 mm port. The subcutaneous fat was copiously irrigated with water. The skin   was reapproximated with 3-0 Monocryl sutures.    Patient tolerated the procedure well without any difficulties and was   subsequently transferred to the PACU in stable condition, extubated.       ____________________________________     MD ORIANA JEFFERS / YUE    DD:  02/01/2018 19:15:06  DT:  02/01/2018 20:00:35    D#:  [id number]  Job#:  [id number]

## 2018-02-02 NOTE — DISCHARGE INSTRUCTIONS
Discharge Instructions  No heavy lifting greater than 10 pounds for minimum 6 weeks   Nothing in vagina (ie no tampons, douching, intercourse) for minimum of 6 weeks   No driving while taking narcotics   Return to our office as directed and call to confirm appointment   Call our office [phone] if you develop any fevers, chills, nausea/vomiting, heavy vaginal bleeding, or redness, tenderness, and/or drainage from your wound, if you have persistent watery discharge while ambulating or stool draining from the vagina .   You have had a ureteral stent placed, please make sure that you have an appointment for a stent removal in six weeks in the office after surgery. Please drink lots of fluids. You may have some blood in the urine and discomfort from your stents. Please call [phone] if you have any questions or concerns.   Showering is ok after shower make sure wound is dry.   You may keep the wound dressing and change everyday. After 2 weeks from surgery you may keep the wound dressing off.   You may have vaginal spotting or light bleeding which is normal.   If you have not had a bowel movement for 2 days, please take over the counter Milk of Magnesium, 1 tablespoon every 4 hours. After 4 doses and if you still have not had a bowel movement, please call your doctor.   You may eat soft diet, such as soup, liquid, for day #1 and if tolerating you may resume your regular diet.    -------------------------------------------------------------------------------------------------------------------------------------------------------------------------------------------------------------------------------  Discharged to home by car with relative. Discharged via wheelchair, hospital escort: Yes.  Special equipment needed: Not Applicable    Be sure to schedule a follow-up appointment with your primary care doctor or any specialists as instructed.     Discharge Plan:   Diet Plan: Discussed  Activity Level: Discussed  Confirmed  Follow up Appointment: Patient to Call and Schedule Appointment  Confirmed Symptoms Management: Discussed  Medication Reconciliation Updated: Yes    I understand that a diet low in cholesterol, fat, and sodium is recommended for good health. Unless I have been given specific instructions below for another diet, I accept this instruction as my diet prescription.   Other diet: per MD order    Special Instructions: None    · Is patient discharged on Warfarin / Coumadin?   No     Depression / Suicide Risk    As [facility] are [facility], it is important to learn how to keep safe from harming yourself.    Recognize the warning signs:  · Abrupt changes in personality, positive or negative- including increase in energy   · Giving away possessions  · Change in eating patterns- significant weight changes-  positive or negative  · Change in sleeping patterns- unable to sleep or sleeping all the time   · Unwillingness or inability to communicate  · Depression  · Unusual sadness, discouragement and loneliness  · Talk of wanting to die  · Neglect of personal appearance   · Rebelliousness- reckless behavior  · Withdrawal from people/activities they love  · Confusion- inability to concentrate     If you or a loved one observes any of these behaviors or has concerns about self-harm, here's what you can do:  · Talk about it- your feelings and reasons for harming yourself  · Remove any means that you might use to hurt yourself (examples: pills, rope, extension cords, firearm)  · Get professional help from the community (Mental Health, Substance Abuse, psychological counseling)  · Do not be alone:Call your Safe Contact- someone whom you trust who will be there for you.  · Call your local CRISIS HOTLINE 781-1594 or 366-240-8088  · Call your local Children's Mobile Crisis Response Team [place] (800) 737-4675 or www.[employer]  · Call the toll free National Suicide Prevention Hotlines   · National  Suicide Prevention [facility] 616-362-VIWT (4637)  · [facility] 800-SUICIDE (075-4383)    Cervical Cancer  The cervix is the opening and bottom part of the uterus between the vagina and the uterus. Cervical cancer is a fairly common cancer. It occurs most often in women between the ages of 40 years and 55 years. Cells of the cervix act very much like skin cells. These cells are exposed to toxins, viruses, and bacteria that may cause abnormal changes.   There are two kinds of cancers of the cervix:   · Squamous cell carcinoma. This type of cancer starts in the flat or scale-like cells that line the cervix. Squamous cell carcinoma can develop from a sexually transmitted infection caused by the human papillomavirus (HPV).  · Adenocarcinoma. This type of cervical cancer starts in glandular cells that line the cervix.  RISK FACTORS  The risk of getting cancer of the cervix is related to your lifestyle, sexual history, health, and immune system. Risks for cervical cancer include:   · Having a sexually transmitted viral infection. These include:  ¨ Chlamydia.    ¨ Herpes.    ¨ HPV.  · Becoming sexually active before age 18 years.  · Having more than one sexual partner or having sex with someone who has more than one sexual partner.  · Not using condoms with sexual partners.  · Having had cancer of the vagina or vulva.  · Having a sexual partner who has or had cancer of the penis or who has had a sexual partner with abnormal cervical cells (dysplasia) or cervical cancer.  · Using oral contraceptives (also called birth control pills).  · Smoking.    · Having a weakened immune system. For example, human immunodeficiency virus (HIV) or other immune deficiency disorders.  · Being the daughter of a woman who took diethylstilbestrol (LATRELL) during pregnancy.  · Having a sister or mother who has had cancer of the cervix.  · Being , , , or a woman from the [place].  · A history of  dysplasia of the cervix.  SIGNS AND SYMPTOMS   Symptoms are usually not present in the early stages of cervical cancer. Once the cancer invades the cervix and surrounding tissues, the woman may have:   · Abnormal vaginal bleeding or menstrual bleeding that is longer or heavier than usual.  · Bleeding after intercourse, douching, or a Pap test.  · Vaginal bleeding following menopause.  · Abnormal vaginal discharge.   · Pelvic discomfort or pain.   · An abnormal Pap test.  · Pain during sexual intercourse.  Symptoms of more advanced cervical cancer may include:   · Loss of appetite or weight loss.  · Tiredness (fatigue).  · Back and leg pain.  · Inability to control urination or bowel movements.  DIAGNOSIS   A pelvic exam and Pap test are done to diagnose the condition. If abnormalities are found during the exam or Pap test, the Pap test may be repeated in 3 months, or your health care provider may do additional tests or procedures, such as:   · A colposcopy. This is a procedure that uses a special microscope that allows the health care provider to magnify and closely examine the cells of the cervix, vagina, and vulva.  · Cervical biopsies. This is a procedure where small tissue samples are taken from the cervix to be examined under a microscope by a specialist.    · A cone biopsy. This is a procedure to test for or remove cancerous tissue.  Other tests may be needed, including:   · Cystoscopy.    · Proctoscopy or sigmoidoscopy.  · Ultrasound.    · CT scan.    · MRI.    · Laparoscopy.    There are different stages of cervical cancer:   · Stage 0, carcinoma in situ (CIS)--This first stage of cancer is the last and most serious stage of dysplasia.  · Stage I--This means the tumor is in the uterus and cervix only.  · Stage II--This means the tumor has spread to the upper vagina. The cancer has spread beyond the uterus but not to the pelvic walls or lower third of the vagina.  · Stage III--This means the tumor has invaded  the side wall of the pelvis and the lower third of the vagina. If the tumor blocks the tubes that carry urine to the bladder (ureters), it may cause urine to back up and the kidneys to swell (hydronephrosis).  · Stage IV--This means the tumor has spread to the rectum or bladder. In the later part of this stage, it has also spread to distant organs, like the lungs.  TREATMENT   Treatment options can include:   · Cone biopsy to remove the cancerous tissue.    · Removal of the entire uterus and cervix.    · Removal of the uterus, cervix, upper vagina, lymph nodes, and surrounding tissue (modified radical hysterectomy). The ovaries may be left in place or removed.  · Medicines to treat cancer.    · A combination of surgery, radiation, and chemotherapy.    · Biological response modifiers. These are substances that help strengthen your immune system's fight against cancer or infection. They may be used in combination with chemotherapy.  HOME CARE INSTRUCTIONS   · Get a gynecology exam and Pap test once every year or as directed by your health care provider.    · Get the HPV vaccine.    · Do not smoke.  · Do not have sexual intercourse until your health care provider says it is okay.  · Use a condom every time you have sex.   SEEK MEDICAL CARE IF:   · You have increased pelvic pain or pressure.    · Your are becoming increasingly tired.    · You have increased leg or back pain.    · You have a fever.  · You have abnormal bleeding or discharge.  · You lose weight.  SEEK IMMEDIATE MEDICAL CARE IF:   · You cannot urinate.  · You have blood in your urine.    · You have blood or pressure with a bowel movement.    · You develop severe back, stomach, or pelvic pain.     This information is not intended to replace advice given to you by your health care provider. Make sure you discuss any questions you have with your health care provider.     Document Released: 12/18/2006 Document Revised: 12/23/2014 Document Reviewed:  06/11/2014  Elsevier Interactive Patient Education ©2016 [employer]

## 2018-02-02 NOTE — PROGRESS NOTES
Pt no longer wants mom to have access or get information from Dr. Or hospital staff. Proper notifications made.

## 2018-02-02 NOTE — PROGRESS NOTES
Pt has voided   Pain controlled  Pt has passed gas   Pt feels ready for discharge at this time, will notify NP with MD Jensen

## 2018-02-02 NOTE — PROGRESS NOTES
Report received.  Assumed Care.  Pt in bed, 412 1. AOx4, responds appropriately.  Denies pain, SOB.  Plan of care discussed.  Explained importance of calling before getting OOB and pt verbalizes understanding.  Call light and belongings within reach, treaded slipper socks on, bed alarm in use, bed in lowest position.  Will monitor frequently.

## 2018-02-02 NOTE — PROGRESS NOTES
"15mg Percocet given this AM  Pt family here to see pt, as soon as family came the pt began acting very strange / anxious. C/o 9/10 \"gas pain\"   APN Paged for orders. Pt very anxious & guarded   "

## 2018-02-03 NOTE — DISCHARGE SUMMARY
ADMITTING DIAGNOSIS:  Stage 1B2 cervical cancer, desiring definitive surgical   therapy.    DISCHARGE DIAGNOSIS:  Cervical cancer, pending final pathology results.    PROCEDURES:  The patient underwent robotic nerve sparing radical hysterectomy   with bilateral salpingectomy, cystoscopy with double-J ureteral stent and   bilateral oophoropexy.    PATHOLOGY:  Pending.    LABORATORY DATA:  WBC is 13.3, hemoglobin 12.8, hematocrit 39.2, and platelets   are 261.  Sodium 139, potassium 4.1, chloride 110, CO2 of 22, BUN 8,   creatinine 0.86, and glucose 106.    HOSPITAL COURSE:  Patient was admitted to the hospital after undergoing the   above stated procedure.  Due to intractable nausea, the patient was given   antinausea medications and was started on clears and diet was advanced as   tolerable.  Patient had passed flatus, was tolerating solid food.  Pain was   well controlled.  Patient was discharged to home.    DISCHARGE INSTRUCTIONS:  1.  No heavy lifting greater than 10 pounds for another 6 weeks, no driving   while taking pain medication.  2.  Nothing in the vagina for 6 weeks.  3.  The patient is to call [phone] if any questions or concerns to confirm   postoperative appointment.    ACTIVITY:  As tolerable.    DIET:  As tolerable.    DISCHARGE MEDICATIONS:  Patient was given a prescription for Percocet 7.5/325   one to two tabs p.o. q. 6 hours p.r.n. pain, #60, no refills, Reglan 10 mg 1   tab p.o. q. 6 hours p.r.n. nausea, #30, no refills; and Zofran 4 mg 1 tab p.o.   q. 6 hours for nausea, #30, no refills.       ____________________________________     YVETTE BOATENG / YUE    DD:  02/02/2018 14:14:54  DT:  02/02/2018 21:05:33    D#:  [id number]  Job#:  [id number]

## 2018-02-11 ENCOUNTER — HOSPITAL ENCOUNTER (INPATIENT)
Facility: MEDICAL CENTER | Age: 38
LOS: 2 days | DRG: 815 | End: 2018-02-14
Attending: EMERGENCY MEDICINE | Admitting: SPECIALIST
Payer: MEDICAID

## 2018-02-11 ENCOUNTER — APPOINTMENT (OUTPATIENT)
Dept: RADIOLOGY | Facility: MEDICAL CENTER | Age: 38
DRG: 815 | End: 2018-02-11
Attending: EMERGENCY MEDICINE
Payer: MEDICAID

## 2018-02-11 DIAGNOSIS — G89.18 POSTOPERATIVE PAIN: ICD-10-CM

## 2018-02-11 DIAGNOSIS — C53.1 MALIGNANT NEOPLASM OF EXOCERVIX (HCC): ICD-10-CM

## 2018-02-11 DIAGNOSIS — G89.18 ACUTE POST-OPERATIVE PAIN: ICD-10-CM

## 2018-02-11 LAB
ANISOCYTOSIS BLD QL SMEAR: ABNORMAL
BASOPHILS # BLD AUTO: 0 % (ref 0–1.8)
BASOPHILS # BLD: 0 K/UL (ref 0–0.12)
EOSINOPHIL # BLD AUTO: 0 K/UL (ref 0–0.51)
EOSINOPHIL NFR BLD: 0 % (ref 0–6.9)
ERYTHROCYTE [DISTWIDTH] IN BLOOD BY AUTOMATED COUNT: 44.4 FL (ref 35.9–50)
HCT VFR BLD AUTO: 32.2 % (ref 37–47)
HGB BLD-MCNC: 10.6 G/DL (ref 12–16)
LYMPHOCYTES # BLD AUTO: 1.63 K/UL (ref 1–4.8)
LYMPHOCYTES NFR BLD: 6.1 % (ref 22–41)
MACROCYTES BLD QL SMEAR: ABNORMAL
MANUAL DIFF BLD: NORMAL
MCH RBC QN AUTO: 31.1 PG (ref 27–33)
MCHC RBC AUTO-ENTMCNC: 32.9 G/DL (ref 33.6–35)
MCV RBC AUTO: 94.4 FL (ref 81.4–97.8)
MONOCYTES # BLD AUTO: 0.69 K/UL (ref 0–0.85)
MONOCYTES NFR BLD AUTO: 2.6 % (ref 0–13.4)
MORPHOLOGY BLD-IMP: NORMAL
MYELOCYTES NFR BLD MANUAL: 0.9 %
NEUTROPHILS # BLD AUTO: 24.14 K/UL (ref 2–7.15)
NEUTROPHILS NFR BLD: 89.5 % (ref 44–72)
NEUTS BAND NFR BLD MANUAL: 0.9 % (ref 0–10)
NRBC # BLD AUTO: 0 K/UL
NRBC BLD-RTO: 0 /100 WBC
PLATELET # BLD AUTO: 689 K/UL (ref 164–446)
PLATELET BLD QL SMEAR: NORMAL
PMV BLD AUTO: 9.1 FL (ref 9–12.9)
POLYCHROMASIA BLD QL SMEAR: NORMAL
RBC # BLD AUTO: 3.41 M/UL (ref 4.2–5.4)
RBC BLD AUTO: PRESENT
WBC # BLD AUTO: 26.7 K/UL (ref 4.8–10.8)

## 2018-02-11 PROCEDURE — 85007 BL SMEAR W/DIFF WBC COUNT: CPT

## 2018-02-11 PROCEDURE — 96361 HYDRATE IV INFUSION ADD-ON: CPT

## 2018-02-11 PROCEDURE — 99285 EMERGENCY DEPT VISIT HI MDM: CPT

## 2018-02-11 PROCEDURE — 80053 COMPREHEN METABOLIC PANEL: CPT

## 2018-02-11 PROCEDURE — 36415 COLL VENOUS BLD VENIPUNCTURE: CPT

## 2018-02-11 PROCEDURE — 85027 COMPLETE CBC AUTOMATED: CPT

## 2018-02-11 PROCEDURE — 96374 THER/PROPH/DIAG INJ IV PUSH: CPT

## 2018-02-11 PROCEDURE — 700105 HCHG RX REV CODE 258: Performed by: EMERGENCY MEDICINE

## 2018-02-11 PROCEDURE — 700111 HCHG RX REV CODE 636 W/ 250 OVERRIDE (IP): Performed by: EMERGENCY MEDICINE

## 2018-02-11 PROCEDURE — 96375 TX/PRO/DX INJ NEW DRUG ADDON: CPT

## 2018-02-11 RX ORDER — HALOPERIDOL 5 MG/ML
5 INJECTION INTRAMUSCULAR ONCE
Status: COMPLETED | OUTPATIENT
Start: 2018-02-11 | End: 2018-02-11

## 2018-02-11 RX ORDER — SODIUM CHLORIDE 9 MG/ML
1000 INJECTION, SOLUTION INTRAVENOUS ONCE
Status: COMPLETED | OUTPATIENT
Start: 2018-02-11 | End: 2018-02-12

## 2018-02-11 RX ADMIN — FENTANYL CITRATE 50 MCG: 50 INJECTION INTRAMUSCULAR; INTRAVENOUS at 23:19

## 2018-02-11 RX ADMIN — HALOPERIDOL LACTATE 5 MG: 5 INJECTION, SOLUTION INTRAMUSCULAR at 23:18

## 2018-02-11 RX ADMIN — SODIUM CHLORIDE 1000 ML: 9 INJECTION, SOLUTION INTRAVENOUS at 23:18

## 2018-02-11 ASSESSMENT — LIFESTYLE VARIABLES
AVERAGE NUMBER OF DAYS PER WEEK YOU HAVE A DRINK CONTAINING ALCOHOL: 1
TOTAL SCORE: 0
DO YOU DRINK ALCOHOL: YES
HAVE YOU EVER FELT YOU SHOULD CUT DOWN ON YOUR DRINKING: NO
HOW MANY TIMES IN THE PAST YEAR HAVE YOU HAD 5 OR MORE DRINKS IN A DAY: 0
EVER FELT BAD OR GUILTY ABOUT YOUR DRINKING: NO
ON A TYPICAL DAY WHEN YOU DRINK ALCOHOL HOW MANY DRINKS DO YOU HAVE: 2
HAVE PEOPLE ANNOYED YOU BY CRITICIZING YOUR DRINKING: NO
CONSUMPTION TOTAL: NEGATIVE
TOTAL SCORE: 0
TOTAL SCORE: 0
EVER HAD A DRINK FIRST THING IN THE MORNING TO STEADY YOUR NERVES TO GET RID OF A HANGOVER: NO

## 2018-02-11 ASSESSMENT — PAIN SCALES - GENERAL
PAINLEVEL_OUTOF10: 10
PAINLEVEL_OUTOF10: 10

## 2018-02-12 ENCOUNTER — APPOINTMENT (OUTPATIENT)
Dept: RADIOLOGY | Facility: MEDICAL CENTER | Age: 38
DRG: 815 | End: 2018-02-12
Attending: NURSE PRACTITIONER
Payer: MEDICAID

## 2018-02-12 PROBLEM — G89.18 POSTOPERATIVE PAIN: Status: ACTIVE | Noted: 2018-02-12

## 2018-02-12 PROBLEM — C53.9 CERVICAL CANCER (HCC): Status: ACTIVE | Noted: 2018-02-12

## 2018-02-12 LAB
ALBUMIN SERPL BCP-MCNC: 3.3 G/DL (ref 3.2–4.9)
ALBUMIN/GLOB SERPL: 0.8 G/DL
ALP SERPL-CCNC: 125 U/L (ref 30–99)
ALT SERPL-CCNC: 16 U/L (ref 2–50)
ANION GAP SERPL CALC-SCNC: 11 MMOL/L (ref 0–11.9)
APPEARANCE FLD: CLEAR
APPEARANCE FLD: CLEAR
APPEARANCE UR: CLEAR
AST SERPL-CCNC: 19 U/L (ref 12–45)
BACTERIA #/AREA URNS HPF: NEGATIVE /HPF
BILIRUB SERPL-MCNC: 0.3 MG/DL (ref 0.1–1.5)
BILIRUB UR QL STRIP.AUTO: NEGATIVE
BODY FLD TYPE: NORMAL
BUN SERPL-MCNC: 12 MG/DL (ref 8–22)
CALCIUM SERPL-MCNC: 8.9 MG/DL (ref 8.5–10.5)
CHLORIDE SERPL-SCNC: 99 MMOL/L (ref 96–112)
CO2 SERPL-SCNC: 25 MMOL/L (ref 20–33)
COLOR FLD: YELLOW
COLOR FLD: YELLOW
COLOR UR: YELLOW
CREAT FLD-MCNC: 0.4 MG/DL
CREAT FLD-MCNC: 0.4 MG/DL
CREAT SERPL-MCNC: 0.79 MG/DL (ref 0.5–1.4)
CSF COMMENTS 1658: NORMAL
CSF COMMENTS 1658: NORMAL
EPI CELLS #/AREA URNS HPF: NEGATIVE /HPF
GLOBULIN SER CALC-MCNC: 4.4 G/DL (ref 1.9–3.5)
GLUCOSE SERPL-MCNC: 121 MG/DL (ref 65–99)
GLUCOSE UR STRIP.AUTO-MCNC: NEGATIVE MG/DL
GRAM STN SPEC: NORMAL
GRAM STN SPEC: NORMAL
HYALINE CASTS #/AREA URNS LPF: ABNORMAL /LPF
KETONES UR STRIP.AUTO-MCNC: ABNORMAL MG/DL
LEUKOCYTE ESTERASE UR QL STRIP.AUTO: ABNORMAL
LYMPHOCYTES NFR FLD: 97 %
LYMPHOCYTES NFR FLD: 98 %
MICRO URNS: ABNORMAL
MONONUC CELLS NFR FLD: 2 %
MONONUC CELLS NFR FLD: 3 %
NITRITE UR QL STRIP.AUTO: NEGATIVE
PH UR STRIP.AUTO: 6 [PH]
POTASSIUM SERPL-SCNC: 3.6 MMOL/L (ref 3.6–5.5)
PROT SERPL-MCNC: 7.7 G/DL (ref 6–8.2)
PROT UR QL STRIP: NEGATIVE MG/DL
RBC # FLD: <2000 CELLS/UL
RBC # FLD: <2000 CELLS/UL
RBC # URNS HPF: ABNORMAL /HPF
RBC UR QL AUTO: ABNORMAL
SIGNIFICANT IND 70042: NORMAL
SIGNIFICANT IND 70042: NORMAL
SITE SITE: NORMAL
SITE SITE: NORMAL
SODIUM SERPL-SCNC: 135 MMOL/L (ref 135–145)
SOURCE SOURCE: NORMAL
SOURCE SOURCE: NORMAL
SP GR UR REFRACTOMETRY: >1.045
SP GR UR STRIP.AUTO: >=1.045
UROBILINOGEN UR STRIP.AUTO-MCNC: 0.2 MG/DL
WBC # FLD: 24 CELLS/UL
WBC # FLD: 376 CELLS/UL
WBC #/AREA URNS HPF: ABNORMAL /HPF

## 2018-02-12 PROCEDURE — G0378 HOSPITAL OBSERVATION PER HR: HCPCS

## 2018-02-12 PROCEDURE — 87086 URINE CULTURE/COLONY COUNT: CPT

## 2018-02-12 PROCEDURE — 700117 HCHG RX CONTRAST REV CODE 255: Performed by: EMERGENCY MEDICINE

## 2018-02-12 PROCEDURE — 700105 HCHG RX REV CODE 258: Performed by: NURSE PRACTITIONER

## 2018-02-12 PROCEDURE — 87070 CULTURE OTHR SPECIMN AEROBIC: CPT | Mod: 91

## 2018-02-12 PROCEDURE — 770004 HCHG ROOM/CARE - ONCOLOGY PRIVATE *

## 2018-02-12 PROCEDURE — 0W9G30Z DRAINAGE OF PERITONEAL CAVITY WITH DRAINAGE DEVICE, PERCUTANEOUS APPROACH: ICD-10-PCS | Performed by: RADIOLOGY

## 2018-02-12 PROCEDURE — 93970 EXTREMITY STUDY: CPT | Mod: 26 | Performed by: SURGERY

## 2018-02-12 PROCEDURE — 87205 SMEAR GRAM STAIN: CPT

## 2018-02-12 PROCEDURE — 700102 HCHG RX REV CODE 250 W/ 637 OVERRIDE(OP): Performed by: NURSE PRACTITIONER

## 2018-02-12 PROCEDURE — 99153 MOD SED SAME PHYS/QHP EA: CPT

## 2018-02-12 PROCEDURE — 71046 X-RAY EXAM CHEST 2 VIEWS: CPT

## 2018-02-12 PROCEDURE — 82570 ASSAY OF URINE CREATININE: CPT | Mod: 91

## 2018-02-12 PROCEDURE — 700111 HCHG RX REV CODE 636 W/ 250 OVERRIDE (IP)

## 2018-02-12 PROCEDURE — 96376 TX/PRO/DX INJ SAME DRUG ADON: CPT

## 2018-02-12 PROCEDURE — 96361 HYDRATE IV INFUSION ADD-ON: CPT

## 2018-02-12 PROCEDURE — 81001 URINALYSIS AUTO W/SCOPE: CPT

## 2018-02-12 PROCEDURE — A9270 NON-COVERED ITEM OR SERVICE: HCPCS | Performed by: NURSE PRACTITIONER

## 2018-02-12 PROCEDURE — 74177 CT ABD & PELVIS W/CONTRAST: CPT

## 2018-02-12 PROCEDURE — 700111 HCHG RX REV CODE 636 W/ 250 OVERRIDE (IP): Performed by: RADIOLOGY

## 2018-02-12 PROCEDURE — 93970 EXTREMITY STUDY: CPT

## 2018-02-12 PROCEDURE — 700111 HCHG RX REV CODE 636 W/ 250 OVERRIDE (IP): Performed by: EMERGENCY MEDICINE

## 2018-02-12 PROCEDURE — 700102 HCHG RX REV CODE 250 W/ 637 OVERRIDE(OP): Performed by: RADIOLOGY

## 2018-02-12 PROCEDURE — 700111 HCHG RX REV CODE 636 W/ 250 OVERRIDE (IP): Performed by: NURSE PRACTITIONER

## 2018-02-12 PROCEDURE — A9270 NON-COVERED ITEM OR SERVICE: HCPCS | Performed by: RADIOLOGY

## 2018-02-12 PROCEDURE — 89051 BODY FLUID CELL COUNT: CPT

## 2018-02-12 RX ORDER — OXYCODONE HYDROCHLORIDE 5 MG/1
2.5 TABLET ORAL
Status: DISPENSED | OUTPATIENT
Start: 2018-02-12 | End: 2018-02-13

## 2018-02-12 RX ORDER — MORPHINE SULFATE 4 MG/ML
2-4 INJECTION, SOLUTION INTRAMUSCULAR; INTRAVENOUS
Status: DISCONTINUED | OUTPATIENT
Start: 2018-02-12 | End: 2018-02-14 | Stop reason: HOSPADM

## 2018-02-12 RX ORDER — ONDANSETRON 2 MG/ML
4 INJECTION INTRAMUSCULAR; INTRAVENOUS EVERY 4 HOURS PRN
Status: DISCONTINUED | OUTPATIENT
Start: 2018-02-12 | End: 2018-02-13

## 2018-02-12 RX ORDER — ONDANSETRON 2 MG/ML
INJECTION INTRAMUSCULAR; INTRAVENOUS
Status: COMPLETED
Start: 2018-02-12 | End: 2018-02-12

## 2018-02-12 RX ORDER — ONDANSETRON 2 MG/ML
4 INJECTION INTRAMUSCULAR; INTRAVENOUS PRN
Status: ACTIVE | OUTPATIENT
Start: 2018-02-12 | End: 2018-02-12

## 2018-02-12 RX ORDER — MIDAZOLAM HYDROCHLORIDE 1 MG/ML
INJECTION INTRAMUSCULAR; INTRAVENOUS
Status: COMPLETED
Start: 2018-02-12 | End: 2018-02-12

## 2018-02-12 RX ORDER — ACETAMINOPHEN 325 MG/1
650 TABLET ORAL EVERY 6 HOURS PRN
Status: DISCONTINUED | OUTPATIENT
Start: 2018-02-12 | End: 2018-02-14 | Stop reason: HOSPADM

## 2018-02-12 RX ORDER — MIDAZOLAM HYDROCHLORIDE 1 MG/ML
.5-2 INJECTION INTRAMUSCULAR; INTRAVENOUS PRN
Status: ACTIVE | OUTPATIENT
Start: 2018-02-12 | End: 2018-02-12

## 2018-02-12 RX ORDER — SODIUM CHLORIDE 9 MG/ML
INJECTION, SOLUTION INTRAVENOUS CONTINUOUS
Status: DISCONTINUED | OUTPATIENT
Start: 2018-02-12 | End: 2018-02-14 | Stop reason: HOSPADM

## 2018-02-12 RX ORDER — SODIUM CHLORIDE 9 MG/ML
500 INJECTION, SOLUTION INTRAVENOUS
Status: ACTIVE | OUTPATIENT
Start: 2018-02-12 | End: 2018-02-12

## 2018-02-12 RX ORDER — CIPROFLOXACIN 2 MG/ML
400 INJECTION, SOLUTION INTRAVENOUS EVERY 12 HOURS
Status: DISCONTINUED | OUTPATIENT
Start: 2018-02-12 | End: 2018-02-14 | Stop reason: HOSPADM

## 2018-02-12 RX ORDER — OXYCODONE HYDROCHLORIDE 5 MG/1
5 TABLET ORAL
Status: DISPENSED | OUTPATIENT
Start: 2018-02-12 | End: 2018-02-13

## 2018-02-12 RX ADMIN — ACETAMINOPHEN 650 MG: 325 TABLET, FILM COATED ORAL at 08:20

## 2018-02-12 RX ADMIN — FENTANYL CITRATE 50 MCG: 50 INJECTION INTRAMUSCULAR; INTRAVENOUS at 00:21

## 2018-02-12 RX ADMIN — MIDAZOLAM 1 MG: 1 INJECTION INTRAMUSCULAR; INTRAVENOUS at 09:01

## 2018-02-12 RX ADMIN — FENTANYL CITRATE 50 MCG: 50 INJECTION, SOLUTION INTRAMUSCULAR; INTRAVENOUS at 08:59

## 2018-02-12 RX ADMIN — MORPHINE SULFATE 4 MG: 4 INJECTION INTRAVENOUS at 21:40

## 2018-02-12 RX ADMIN — SODIUM CHLORIDE: 9 INJECTION, SOLUTION INTRAVENOUS at 02:16

## 2018-02-12 RX ADMIN — MORPHINE SULFATE 2 MG: 4 INJECTION INTRAVENOUS at 02:24

## 2018-02-12 RX ADMIN — OXYCODONE HYDROCHLORIDE 5 MG: 5 TABLET ORAL at 20:29

## 2018-02-12 RX ADMIN — IOHEXOL 100 ML: 350 INJECTION, SOLUTION INTRAVENOUS at 00:03

## 2018-02-12 RX ADMIN — FENTANYL CITRATE 50 MCG: 50 INJECTION, SOLUTION INTRAMUSCULAR; INTRAVENOUS at 09:05

## 2018-02-12 RX ADMIN — OXYCODONE HYDROCHLORIDE 2.5 MG: 5 TABLET ORAL at 14:12

## 2018-02-12 RX ADMIN — MORPHINE SULFATE 2 MG: 4 INJECTION INTRAVENOUS at 04:44

## 2018-02-12 RX ADMIN — CIPROFLOXACIN 400 MG: 2 INJECTION, SOLUTION INTRAVENOUS at 08:30

## 2018-02-12 RX ADMIN — MIDAZOLAM 1 MG: 1 INJECTION INTRAMUSCULAR; INTRAVENOUS at 09:18

## 2018-02-12 RX ADMIN — ONDANSETRON 4 MG: 2 INJECTION INTRAMUSCULAR; INTRAVENOUS at 08:55

## 2018-02-12 RX ADMIN — OXYCODONE HYDROCHLORIDE 5 MG: 5 TABLET ORAL at 23:37

## 2018-02-12 RX ADMIN — MIDAZOLAM 1 MG: 1 INJECTION INTRAMUSCULAR; INTRAVENOUS at 09:08

## 2018-02-12 RX ADMIN — VANCOMYCIN HYDROCHLORIDE 1400 MG: 100 INJECTION, POWDER, LYOPHILIZED, FOR SOLUTION INTRAVENOUS at 21:39

## 2018-02-12 RX ADMIN — MIDAZOLAM 1 MG: 1 INJECTION INTRAMUSCULAR; INTRAVENOUS at 09:06

## 2018-02-12 RX ADMIN — OXYCODONE HYDROCHLORIDE 5 MG: 5 TABLET ORAL at 17:21

## 2018-02-12 RX ADMIN — VANCOMYCIN HYDROCHLORIDE 2000 MG: 100 INJECTION, POWDER, LYOPHILIZED, FOR SOLUTION INTRAVENOUS at 10:32

## 2018-02-12 RX ADMIN — CIPROFLOXACIN 400 MG: 2 INJECTION, SOLUTION INTRAVENOUS at 20:29

## 2018-02-12 RX ADMIN — SODIUM CHLORIDE: 9 INJECTION, SOLUTION INTRAVENOUS at 16:09

## 2018-02-12 RX ADMIN — MORPHINE SULFATE 4 MG: 4 INJECTION INTRAVENOUS at 07:10

## 2018-02-12 RX ADMIN — MORPHINE SULFATE 4 MG: 4 INJECTION INTRAVENOUS at 18:29

## 2018-02-12 ASSESSMENT — PAIN SCALES - GENERAL
PAINLEVEL_OUTOF10: 4
PAINLEVEL_OUTOF10: 6
PAINLEVEL_OUTOF10: 2
PAINLEVEL_OUTOF10: 3
PAINLEVEL_OUTOF10: 8
PAINLEVEL_OUTOF10: 6
PAINLEVEL_OUTOF10: 6
PAINLEVEL_OUTOF10: 10
PAINLEVEL_OUTOF10: 0
PAINLEVEL_OUTOF10: 4
PAINLEVEL_OUTOF10: 4
PAINLEVEL_OUTOF10: 5
PAINLEVEL_OUTOF10: 7

## 2018-02-12 ASSESSMENT — PATIENT HEALTH QUESTIONNAIRE - PHQ9
2. FEELING DOWN, DEPRESSED, IRRITABLE, OR HOPELESS: NOT AT ALL
1. LITTLE INTEREST OR PLEASURE IN DOING THINGS: NOT AT ALL
SUM OF ALL RESPONSES TO PHQ9 QUESTIONS 1 AND 2: 0
2. FEELING DOWN, DEPRESSED, IRRITABLE, OR HOPELESS: NOT AT ALL
SUM OF ALL RESPONSES TO PHQ QUESTIONS 1-9: 0
SUM OF ALL RESPONSES TO PHQ QUESTIONS 1-9: 0
1. LITTLE INTEREST OR PLEASURE IN DOING THINGS: NOT AT ALL
SUM OF ALL RESPONSES TO PHQ9 QUESTIONS 1 AND 2: 0

## 2018-02-12 ASSESSMENT — ENCOUNTER SYMPTOMS
NAUSEA: 0
FEVER: 1
CHILLS: 0
VOMITING: 0
SHORTNESS OF BREATH: 0

## 2018-02-12 ASSESSMENT — LIFESTYLE VARIABLES
EVER_SMOKED: YES
HOW MANY TIMES IN THE PAST YEAR HAVE YOU HAD 5 OR MORE DRINKS IN A DAY: 0
EVER FELT BAD OR GUILTY ABOUT YOUR DRINKING: NO
CONSUMPTION TOTAL: NEGATIVE
TOTAL SCORE: 0
EVER HAD A DRINK FIRST THING IN THE MORNING TO STEADY YOUR NERVES TO GET RID OF A HANGOVER: NO
HAVE YOU EVER FELT YOU SHOULD CUT DOWN ON YOUR DRINKING: NO
PACK_YEARS: 12
ALCOHOL_USE: YES
HAVE PEOPLE ANNOYED YOU BY CRITICIZING YOUR DRINKING: NO
AVERAGE NUMBER OF DAYS PER WEEK YOU HAVE A DRINK CONTAINING ALCOHOL: 1
TOTAL SCORE: 0
TOTAL SCORE: 0
ON A TYPICAL DAY WHEN YOU DRINK ALCOHOL HOW MANY DRINKS DO YOU HAVE: 2

## 2018-02-12 NOTE — PROGRESS NOTES
"Pharmacy Kinetics 37 y.o. female on vancomycin day # 1 2018    Currently on Vancomycin 2000 mg iv loading dose followed by 1400mg q12hr    Indication for Treatment: empiric- r/o abdominal abscess    Pertinent history per medical record: Admitted on 2018 for severe and generalized abdominal pain. S/p hysterectomy performed by Dr. Jensen, OB-GYN, on 2017. Ct in ER showing bilateral pelvic sidewall fluid collections. Empiric antibiotics started for r/o abscess.     Other antibiotics:   cipro 400mg IV q12h    Allergies: Seroquel [quetiapine fumarate]     List concerns for renal function: obesity with BMI ~ 30    Pertinent cultures to date:   pending    Recent Labs      18   2312   WBC  26.7*   NEUTSPOLYS  89.50*   BANDSSTABS  0.90     Recent Labs      18   2312   BUN  12   CREATININE  0.79   ALBUMIN  3.3     No results for input(s): VANCOTROUGH, VANCOPEAK, VANCORANDOM in the last 72 hours.No intake or output data in the 24 hours ending 18 0909   Blood pressure 115/50, pulse (!) 108, temperature (!) 38.4 °C (101.2 °F), resp. rate (!) 24, height 1.626 m (5' 4\"), weight 79.4 kg (175 lb), last menstrual period 2018, SpO2 95 %, not currently breastfeeding. Temp (24hrs), Av.2 °C (99 °F), Min:36.3 °C (97.4 °F), Max:38.4 °C (101.2 °F)      A/P   1. Vancomycin dose change: new start  2. Next vancomycin level: prior to 3rd total dose ~ 0930 18  3. Goal trough: 12-16mcg/mL  4. Comments: Renal function appears stable at baseline with adequate voids. Pelvic sidewall fluid collections drained 18 and sent for culture. Febrile, tachycardic and with leukocytosis this am. Begin vancomycin as above. Pharmacy will monitor/adjust as needed per protocol.     JOHN Salinas Pharm.D.      "

## 2018-02-12 NOTE — ED PROVIDER NOTES
ED Provider Note    Scribed for Black Nicole M.D. by Demetrius Early. 2/11/2018, 10:57 PM.    Means of arrival: Walk-in  History obtained from: Patient  History limited by: None    CHIEF COMPLAINT  Chief Complaint   Patient presents with   • Post-Op Pain       HPI  Korina Lyons is a 37 y.o. female who presents to the Emergency Department complaining of severe and generalized abdominal pain. Per nursing notes, her pain is a 10/10 in severity. Patient received a hysterectomy performed by Dr. Jensen, OB-GYN, on 2/1/2017. When she left the hospital her pain was well-controlled with her percocet. Today her percocet prescription ran out. She has tried ibuprofen and Tylenol PM without significant relief from her pain. She last took the Tylenol PM at 8:00 PM this evening. She has been unable to sleep secondary to the pain.  She states that she has been unable to find a sleeping position that alleviates her pain. Patient denies fever, vision change, sore throat, chest pain, shortness of breath, nausea, dysuria, focal muscle pain, rashes, or neurologic deficits. Patient contacted Dr. Jensen regarding her pain and she was referred here. She is allergic to Seroquel.    REVIEW OF SYSTEMS  Patient reports abdominal pain. Patient denies fever, vision change, sore throat, chest pain, shortness of breath, nausea, dysuria, focal muscle pain, rashes, or neurologic deficits.   C    PAST MEDICAL HISTORY   has a past medical history of Cancer (CMS-HCC) (2017) and Psychiatric problem.    SURGICAL HISTORY   has a past surgical history that includes dilation and curettage (12/17/2017); hysterectomy robotic xi (2/1/2018); salpingectomy (Bilateral, 2/1/2018); oophorectomy (Bilateral, 2/1/2018); and cystoscopy stent placement (Bilateral, 2/1/2018).    SOCIAL HISTORY  Social History   Substance Use Topics   • Smoking status: Former Smoker     Packs/day: 0.10     Years: 27.00     Types: Cigarettes     Quit date: 1/1/2018   •  "Smokeless tobacco: Never Used   • Alcohol use Yes      Comment: 0-1 per month      History   Drug Use   • Types: Inhaled     Comment: Marijuana daily.  Last smoked 1/28/2018       FAMILY HISTORY  History reviewed. No pertinent family history.    CURRENT MEDICATIONS  Reviewed.  See Encounter Summary.     ALLERGIES  Allergies   Allergen Reactions   • Seroquel [Quetiapine Fumarate]      Restless leg         PHYSICAL EXAM  VITAL SIGNS: /89   Pulse (!) 108   Temp 37 °C (98.6 °F) (Temporal)   Resp 20   Ht 1.626 m (5' 4\")   Wt 79.4 kg (175 lb)   LMP 01/10/2018 (Approximate)   SpO2 98%   BMI 30.04 kg/m²    Pulse ox interpretation: I interpret this pulse ox as normal.  Constitutional: Alert. Significant distress. Tearful.  HENT: Head normocephalic, atraumatic, Bilateral external ears normal, Nose normal.  Eyes: Pupils are equal, extraocular movements intact, Conjunctiva normal, Non-icteric.   Neck: Normal range of motion, Supple, No stridor.   Cardiovascular: Tachycardic. Regular rhythm. No rubs, murmurs or gallops  Thorax & Lungs: No acute respiratory distress, No wheezing, No increased work of breathing.   Abdomen: Soft, Abdomen is slight distended, but non tender. no rebound or guarding, Non-distended, No pulsatile masses, No peritoneal signs.  Skin: Warm, Dry, No erythema, No rash.   Extremities: Intact distal pulses, Lower extremities are not edematous. No tenderness, No cyanosis.    Neurologic: Alert , Normal motor function, Normal sensory function, No focal deficits noted.   Psychiatric: Affect appropriate for clinical situation, Judgment normal, Mood normal.     DIAGNOSTIC STUDIES / PROCEDURES     LABS  Results for orders placed or performed during the hospital encounter of 02/11/18   CBC WITH DIFFERENTIAL   Result Value Ref Range    WBC 26.7 (H) 4.8 - 10.8 K/uL    RBC 3.41 (L) 4.20 - 5.40 M/uL    Hemoglobin 10.6 (L) 12.0 - 16.0 g/dL    Hematocrit 32.2 (L) 37.0 - 47.0 %    MCV 94.4 81.4 - 97.8 fL    MCH " 31.1 27.0 - 33.0 pg    MCHC 32.9 (L) 33.6 - 35.0 g/dL    RDW 44.4 35.9 - 50.0 fL    Platelet Count 689 (H) 164 - 446 K/uL    MPV 9.1 9.0 - 12.9 fL    Nucleated RBC 0.00 /100 WBC    NRBC (Absolute) 0.00 K/uL    Neutrophils-Polys 89.50 (H) 44.00 - 72.00 %    Lymphocytes 6.10 (L) 22.00 - 41.00 %    Monocytes 2.60 0.00 - 13.40 %    Eosinophils 0.00 0.00 - 6.90 %    Basophils 0.00 0.00 - 1.80 %    Neutrophils (Absolute) 24.14 (H) 2.00 - 7.15 K/uL    Lymphs (Absolute) 1.63 1.00 - 4.80 K/uL    Monos (Absolute) 0.69 0.00 - 0.85 K/uL    Eos (Absolute) 0.00 0.00 - 0.51 K/uL    Baso (Absolute) 0.00 0.00 - 0.12 K/uL    Anisocytosis 2+     Macrocytosis 2+    DIFFERENTIAL MANUAL   Result Value Ref Range    Bands-Stabs 0.90 0.00 - 10.00 %    Myelocytes 0.90 %    Manual Diff Status PERFORMED    PERIPHERAL SMEAR REVIEW   Result Value Ref Range    Peripheral Smear Review see below    PLATELET ESTIMATE   Result Value Ref Range    Plt Estimation Increased    MORPHOLOGY   Result Value Ref Range    RBC Morphology Present     Polychromia 1+      All labs were reviewed by me.    RADIOLOGY  CT-ABDOMEN-PELVIS WITH   Final Result         1. New bilateral fluid collections along the bilateral pelvic sidewalls compressing the urinary bladder.      2. The collections also compress the bilateral iliac veins with questionable poor enhancement in the bilateral iliac veins. Correlate with DVT study.      3. There are bilateral double-J ureteral stents. The proximal loop of the stents are in the proximal ureter.        The radiologist's interpretation of all radiological studies have been reviewed by me.    COURSE & MEDICAL DECISION MAKING  Pertinent Labs & Imaging studies reviewed. (See chart for details)    Review of past medical records shows the patient was diagnosed with cervical cancer and received a laparoscopic hysterectomy on 2/1/2017.    10:57 PM - Patient seen and examined at bedside. Patient will be treated with NS infusion 1 liter for  tachycardia as well as haloperidol injection 5 mg and fentanyl injection 50 mcg. Ordered CBC with differential and CMP to evaluate her symptoms.     12:17 PM I ordered fentanyl injection 50 mg to treat.    12:24 AM Recheck: Patient re-evaluated at [facility]. Patient reports good control of her pain. Her lower extremities are not edematous. She reports no significant lower extremity pain.    12:44 AM I paged Dr. Jensen, OB-GYN.    12:49 AM I discussed the patient's case and the above findings with the Joann Johnson PA-C, who is on call for Dr. Jensen (OB-GYN) who will speak to Dr. Jensen and call me back.    1:05 AM I discussed the patient's case and the above findings with Dr. Jensen (OB-GYN) who agrees to admit the patient and will transfer care of the patient at this time.    Decision Making:  This is a 37 y.o. year old female who presents with pelvic pain after having had a hysterectomy approximately 10 days ago secondary to cervical cancer. The patient states that she ran out of her pain medications 2 days ago, and has been having significant pain since that time. Urine examination the patient appears somewhat bloated but has no real significant tenderness. Differential included postoperative infection versus postoperative pain versus urinary tract infection versus postoperative infection. Here CT scan shows evidence of large fluid collections adjacent to the bladder, and given the patient's new and significant pain, as well as a leukocytosis of 26, and concerned about the possibility of pelvic abscess. Spoke with on-call GYN for Dr. Jensen, who stated that they plan to admit the patient to the hospital for interventional radiology aspiration of the fluid for further evaluation.    DISPOSITION:  Patient will be admitted to Dr. Jensen, PB-GYN, in guarded condition.    FINAL IMPRESSION  1. Acute post-operative pain          Demetrius AMADO (Scribe), am scribing for, and in the presence of, Black Nicole,  M.D..    Electronically signed by: Demetrius Early (Scribe), 2/11/2018    IBlack M.D. personally performed the services described in this documentation, as scribed by Demetrius Early in my presence, and it is both accurate and complete.    The note accurately reflects work and decisions made by me.  Black Nicole  2/12/2018  5:06 AM

## 2018-02-12 NOTE — PROGRESS NOTES
Report received from BETTE Forrester.    Assumed care @0079. Patient A&Ox4. VSS.     Patient reports 10/10 pain in abdomen and back, medicated with PRN morphine. Will continue to assess.    Patient NPO for drain placement tomorrow.     PIV intact and flushes; NS running @100mL/hr.     5 lap sites on abdomen, covered with band aids. No drainage noted.    POC discussed. All needs met at this time. Call light within reach. Bed locked, in lowest position. Hourly rounding in place.

## 2018-02-12 NOTE — PROGRESS NOTES
Patient has been alert and oriented x4 today.  Patient had bilateral lower abdominal LEE drains placed to in Ir.  Pain has decreased since drains placed.  Bilateral drains are putting out a large amount of clear, yellow fluid and both drains have a dime size clot in each drain.  Patient's laparoscopic sites from previous surgery are well approximated with no drainage.  Patient had bowel movement this afternoon.  Small amount of urine output thus far today.  Voiding twice on shift,  measuring 50cc urine on one of the voids.  Patient has been ambulating with one assist to bathroom.

## 2018-02-12 NOTE — PROGRESS NOTES
Patient has fever 101.2F oral.  Notified Joann Johnson Np.  Patient given 650mg tylenol.  Ciprofloxacin running.  Will continue to monitor.

## 2018-02-12 NOTE — CARE PLAN
Problem: Safety  Goal: Will remain free from falls  Bed locked in lowest position. Call light within reach. Gait assessed; steady.  Hourly rounding in place.     Problem: Pain Management  Goal: Pain level will decrease to patient's comfort goal  Patient's pain being managed with PRN morphine.

## 2018-02-12 NOTE — PROGRESS NOTES
CT guided drainage of bilateral pelvic fluid collections performed by Dr Hillman; Lucian RT assisting. Bilateral anterior pelvic drains: #8F looped catheters, secured to patient's skin, aspirated for comfort and lab samples, and attached to compressed bulb devices. Patient tolerated procedure quite well and was returned to her room with RN. Lab samples submitted for culture and creatinine.    [employer]) #8F x 25 cm Flexima APDL ref [id number] lot #[id number]  [employer] #8F x 25 cm Flexima APDL ref [id number] lot #[id number]

## 2018-02-12 NOTE — PROGRESS NOTES
CHIEF COMPLAINT:  Pelvic pain, difficulty sleeping.    HISTORY OF PRESENT ILLNESS:   is a 37 year old [id number] white female whose LMP was 17. She has a past medical history significant for cervical dysplasia. She was in her usual state of health until she was evaluated by her PMD, she had a 1 1/2 year hx of PCP, no IMB, denies and vaginal discharge and had a pap smear with Dr. Kaufman and was noted to have an ASCUS/+ HRHPV pap. She presented to Dr. Keating and was noted to have a 5 cm lesion which, bx showed AIS and invasive adenocarcinoma.  Based on these findings she was referred for further evaluation. She underwent robotic nerve sparing radical hysterectomy with bilateral salpingectomy, cystoscopy with double-J ureteral stent and bilateral oophoropexy 18. She was discharged on  in stable condition.    The patient called on  asking if she could have an alcoholic drink for her 's birthday.  We discussed the importance of no alcohol with narcotic use from recent surgery, and she stated she had not used narcotic for pain in over a week.  Also recommended against alcoholic drink with current stents in place. She states she did not have a drink.  She then called the answering service  stating she was having difficulty sleeping and that marijuana use was too expensive and not helping and had requested refill on narcotic to aid in sleep as she was out.  She said she was taking more tablets than prescribed to offset the pain and that she had been taking them daily. She was referred to the ER if symptoms persisted and presented to ER on .  CT was performed and demonstrated free fluid compressing the bladder and iliac veins and had a significant WBC increase to 26.7. She was thus admitted to the hospital for IR guided drain placement.    ROS:  Skin: Patient denies rashes, skin ulcers and skin problems.   Neurology: Patient denies seizures, fainting and numbness of fingers or toes.  Psychiatry: Patient denies depression and psychiatric problems.   Endocrinology: Patient denies diabetes, thyroid problems and hot flashes. Genitourinary: Patient denies vaginal discharge and loss of urine; especially w/coughing; denies blood in urine, pain with urination, vaginal bleeding  Hematology/Lymphatic: Patient denies bruising easily and bleeding gums.   ENT: Patient denies cold symptoms (cough, runny nose, sore throat); denies mouth ulcers.   Cardiovascular: Patient denies chest pain, shortness of breath while lying flat and shortness of breath while walking or climbing stairs.   Respiratory: Patient denies shortness of breath at rest and any wheezing (difficulty breathing).   Gastrointestinal: Patient denies nausea, vomiting, diarrhea and bloody stools. Musculoskeletal: Patient denies muscle weakness and arthritis/painful joints. Constitutional: Patient denies weight change, fatigue and change in vision.    MEDS/ALLERGIES:  MEDICATIONS: Multivitamin Multiple Vitamins tablet 0 Tablet PO LaMICtal 100 mg tablet 0 Tablet PO (On Hold) ALLERGIES: SEROquel -- RASH    PROBLEM LIST:  CHRONIC: 12/29/2017 Malignant neoplasm of cervix uteri, unspecified    SURGICAL HISTORY:  12/29/2017 No Previous Surgery    FAMILY HISTORY:  Positive For * Cancer Father * None    SOCIAL HISTORY:  Alcohol Use: Yes Occasional. Hobbies: Other. Marital Status: Partnered. Recreational Drug Use: mariuana. Pentecostal Preference: Unknown. Smoking Status: Current every day smoker. (67657-7) Have you used tobacco in the last 30D (Yes). (10560-3) Have you used smokeless tobacco product in the last 30D (Yes). Unknown if ever smoked.    EXAM:  GENERAL - Well developed, well nourished female in no obvious acute distress, febrile.   HEENT - Within normal limits, no sclera icterus, no pallor of conjunctiva.   NECK - Supple, no thyroid masses , no supraclavicular or cervical adenopathy.   SKIN - No lesions, rashes, or tenting.   CARDIAC - regular rate  and rhythm, no murmur, clicks, or gallops.   LUNGS - Clear to auscultation bilaterally, no rales, rhonci, or wheezes.   ABDOMEN - soft, tender, non-distended, no palpable masses, no hepatosplenomegaly, no inguinal lymphadenopathy.   PELVIS - deferred  EXTREMITIES - No clubbing, cyanosis, edema, nontender, 2+ pedal pulses. MUSCLE - normal tone.   NEURO - grossly intact.    ASSESSMENT:    C53.9 Malignant neoplasm of cervix uteri, unspecified N/A          COMMENT:  IB2 Cervical cancer: PET scan shows localized disease and no evidence of extracervical spread. S/p robotic nerve sparing radical hysterectomy with bilateral salpingectomy, cystoscopy with double-J ureteral stent and bilateral oophoropexy 2/1/18.    PLAN:  We will admit patient for observation and IR guided drainage of peritoneal fluid collection.  We will culture with gram stain and test fluid creatinine.  We will empirically start her on vanco and cipro.  We will administer IV fluids and prn medication for pain.  We will pan culture to r/o source of infection.

## 2018-02-12 NOTE — ED TRIAGE NOTES
Chief Complaint   Patient presents with   • Post-Op Pain     Agree with triage RN.  Pt to room via wheelchair.  Pt in tears in room.  Pt states 10/10 pain since her pain medication ran out two days ago.  Pt has tried ibuprofen with no relief.  Pt chart up for EP.  Pt on monitor.

## 2018-02-12 NOTE — ED TRIAGE NOTES
"Chief Complaint   Patient presents with   • Post-Op Pain     Patient to triage via wheelchair. Patient states that she had a hysterectomy on 2/1. Patient says that her insurance would not allow her to have the \"higher doses of percocet\". Patient ran out two days ago and has been unable to control her pain or sleep. Patient in tears during triage assessment. /89   Pulse 100   Temp 37 °C (98.6 °F) (Temporal)   Resp 20   Ht 1.626 m (5' 4\")   Wt 79.4 kg (175 lb)   SpO2 97%   BMI 30.04 kg/m²     "

## 2018-02-12 NOTE — OR SURGEON
Immediate Post- Operative Note        PostOp Diagnosis: B/L pelvic fluid collections      Procedure(s): CT guided drain placement x2      Estimated Blood Loss: Less than 5 ml        Complications: None            2/12/2018     10:00 AM     Elijah Hillman

## 2018-02-12 NOTE — PROGRESS NOTES
Gynecology Oncology Progress Note               Author: Joann Johnson Date & Time created: 2018  7:32 AM     Interval History:  Patient continues with pain and difficulty sleeping, febrile this am.      Review of Systems:  Review of Systems   Constitutional: Positive for fever. Negative for chills.   Respiratory: Negative for shortness of breath.    Cardiovascular: Negative for chest pain.   Gastrointestinal: Negative for nausea and vomiting.       Physical Exam:  Physical Exam   Constitutional: She is oriented to person, place, and time. She appears well-developed and well-nourished. No distress.   Pulmonary/Chest: Effort normal. No respiratory distress.   Abdominal: Soft. There is tenderness.   Neurological: She is alert and oriented to person, place, and time.   Psychiatric: Her mood appears anxious.       Labs:        Invalid input(s): [id number]      Recent Labs      18   231   SODIUM  135   POTASSIUM  3.6   CHLORIDE  99   CO2  25   BUN  12   CREATININE  0.79   CALCIUM  8.9     Recent Labs      18   2312   ALTSGPT  16   ASTSGOT  19   ALKPHOSPHAT  125*   TBILIRUBIN  0.3   GLUCOSE  121*     Recent Labs      18   2312   RBC  3.41*   HEMOGLOBIN  10.6*   HEMATOCRIT  32.2*   PLATELETCT  689*     Recent Labs      18   2312   WBC  26.7*   NEUTSPOLYS  89.50*   LYMPHOCYTES  6.10*   MONOCYTES  2.60   EOSINOPHILS  0.00   BASOPHILS  0.00   ASTSGOT  19   ALTSGPT  16   ALKPHOSPHAT  125*   TBILIRUBIN  0.3     Recent Labs      18   2312   SODIUM  135   POTASSIUM  3.6   CHLORIDE  99   CO2  25   GLUCOSE  121*   BUN  12   CREATININE  0.79   CALCIUM  8.9     Hemodynamics:  Temp (24hrs), Av.8 °C (98.2 °F), Min:36.3 °C (97.4 °F), Max:37.1 °C (98.7 °F)  Temperature: 37.1 °C (98.7 °F)  Pulse  Av.4  Min: 83  Max: 108Heart Rate (Monitored): 90  Blood Pressure: 120/67, NIBP: 108/72     Respiratory:    Respiration: 20, Pulse Oximetry: 96 %           Fluids:  No intake or output data in the  24 hours ending 02/12/18 0732  Weight: 79.4 kg (175 lb)  GI/Nutrition:  Orders Placed This Encounter   Procedures   • Diet NPO     Standing Status:   Standing     Number of Occurrences:   1     Order Specific Question:   Restrict to:     Answer:   Strict [1]     Medical Decision Making, by Problem:  Active Hospital Problems    Diagnosis   • Cervical cancer (CMS-HCC) [C53.9]   • Postoperative pain [G89.18]       Plan:  Grade 2 adenocarcinoma of the cervix   S/p robotic nerve sparing radical hysterectomy with bilateral salpingectomy, bilateral ureteral stent placement on 2/1.    1. Postoperative pelvic pain- CT showed new fluid collections along pelvic sidewall compressing urinary bladder and iliac veins.  IR guided fluid drainage with gram stain and cytology and creatinine.  DVT study to assess compression iliac veins. Continue IV fluid for possible pain related to ureteral stents.  2. Leukocytosis- likely secondary to lympocyst- prophylactic IV vanco and cipro.  Panculture.  3. Fever- likely secondary to above, tylenol, abx, IR drainage.      Discussed case with Dr. Jensen and team.  Quality-Core Measures

## 2018-02-13 PROBLEM — I89.8: Status: ACTIVE | Noted: 2018-02-13

## 2018-02-13 LAB
ALBUMIN SERPL BCP-MCNC: 2.5 G/DL (ref 3.2–4.9)
ALBUMIN/GLOB SERPL: 0.6 G/DL
ALP SERPL-CCNC: 137 U/L (ref 30–99)
ALT SERPL-CCNC: 15 U/L (ref 2–50)
ANION GAP SERPL CALC-SCNC: 11 MMOL/L (ref 0–11.9)
AST SERPL-CCNC: 19 U/L (ref 12–45)
BASOPHILS # BLD AUTO: 0.5 % (ref 0–1.8)
BASOPHILS # BLD: 0.09 K/UL (ref 0–0.12)
BILIRUB SERPL-MCNC: 0.4 MG/DL (ref 0.1–1.5)
BUN SERPL-MCNC: 7 MG/DL (ref 8–22)
CALCIUM SERPL-MCNC: 8.3 MG/DL (ref 8.5–10.5)
CHLORIDE SERPL-SCNC: 102 MMOL/L (ref 96–112)
CO2 SERPL-SCNC: 23 MMOL/L (ref 20–33)
CREAT SERPL-MCNC: 0.7 MG/DL (ref 0.5–1.4)
EOSINOPHIL # BLD AUTO: 0.17 K/UL (ref 0–0.51)
EOSINOPHIL NFR BLD: 0.9 % (ref 0–6.9)
ERYTHROCYTE [DISTWIDTH] IN BLOOD BY AUTOMATED COUNT: 45.7 FL (ref 35.9–50)
GLOBULIN SER CALC-MCNC: 4.1 G/DL (ref 1.9–3.5)
GLUCOSE SERPL-MCNC: 91 MG/DL (ref 65–99)
HCT VFR BLD AUTO: 29.9 % (ref 37–47)
HGB BLD-MCNC: 10 G/DL (ref 12–16)
IMM GRANULOCYTES # BLD AUTO: 0.79 K/UL (ref 0–0.11)
IMM GRANULOCYTES NFR BLD AUTO: 4.1 % (ref 0–0.9)
LYMPHOCYTES # BLD AUTO: 1.22 K/UL (ref 1–4.8)
LYMPHOCYTES NFR BLD: 6.4 % (ref 22–41)
MCH RBC QN AUTO: 31.8 PG (ref 27–33)
MCHC RBC AUTO-ENTMCNC: 33.4 G/DL (ref 33.6–35)
MCV RBC AUTO: 95.2 FL (ref 81.4–97.8)
MONOCYTES # BLD AUTO: 1.39 K/UL (ref 0–0.85)
MONOCYTES NFR BLD AUTO: 7.3 % (ref 0–13.4)
NEUTROPHILS # BLD AUTO: 15.43 K/UL (ref 2–7.15)
NEUTROPHILS NFR BLD: 80.8 % (ref 44–72)
NRBC # BLD AUTO: 0 K/UL
NRBC BLD-RTO: 0 /100 WBC
PLATELET # BLD AUTO: 430 K/UL (ref 164–446)
PMV BLD AUTO: 10.4 FL (ref 9–12.9)
POTASSIUM SERPL-SCNC: 3.7 MMOL/L (ref 3.6–5.5)
PROT SERPL-MCNC: 6.6 G/DL (ref 6–8.2)
RBC # BLD AUTO: 3.14 M/UL (ref 4.2–5.4)
SODIUM SERPL-SCNC: 136 MMOL/L (ref 135–145)
VANCOMYCIN TROUGH SERPL-MCNC: 12.8 UG/ML (ref 10–20)
WBC # BLD AUTO: 19.1 K/UL (ref 4.8–10.8)

## 2018-02-13 PROCEDURE — 306623 RESERVOIR,SUCTION 100 CC: Performed by: HOSPITALIST

## 2018-02-13 PROCEDURE — 700102 HCHG RX REV CODE 250 W/ 637 OVERRIDE(OP): Performed by: RADIOLOGY

## 2018-02-13 PROCEDURE — 85025 COMPLETE CBC W/AUTO DIFF WBC: CPT

## 2018-02-13 PROCEDURE — 80202 ASSAY OF VANCOMYCIN: CPT

## 2018-02-13 PROCEDURE — 700111 HCHG RX REV CODE 636 W/ 250 OVERRIDE (IP): Performed by: NURSE PRACTITIONER

## 2018-02-13 PROCEDURE — 700105 HCHG RX REV CODE 258: Performed by: NURSE PRACTITIONER

## 2018-02-13 PROCEDURE — 36415 COLL VENOUS BLD VENIPUNCTURE: CPT

## 2018-02-13 PROCEDURE — 80053 COMPREHEN METABOLIC PANEL: CPT

## 2018-02-13 PROCEDURE — A9270 NON-COVERED ITEM OR SERVICE: HCPCS | Performed by: RADIOLOGY

## 2018-02-13 PROCEDURE — 700102 HCHG RX REV CODE 250 W/ 637 OVERRIDE(OP): Performed by: SPECIALIST

## 2018-02-13 PROCEDURE — 770004 HCHG ROOM/CARE - ONCOLOGY PRIVATE *

## 2018-02-13 PROCEDURE — A9270 NON-COVERED ITEM OR SERVICE: HCPCS | Performed by: SPECIALIST

## 2018-02-13 PROCEDURE — 700111 HCHG RX REV CODE 636 W/ 250 OVERRIDE (IP): Performed by: SPECIALIST

## 2018-02-13 RX ORDER — KETOROLAC TROMETHAMINE 10 MG/1
10 TABLET, FILM COATED ORAL EVERY 6 HOURS PRN
Status: DISCONTINUED | OUTPATIENT
Start: 2018-02-13 | End: 2018-02-14 | Stop reason: HOSPADM

## 2018-02-13 RX ORDER — ONDANSETRON 4 MG/1
4 TABLET, ORALLY DISINTEGRATING ORAL EVERY 4 HOURS PRN
Status: DISCONTINUED | OUTPATIENT
Start: 2018-02-13 | End: 2018-02-14 | Stop reason: HOSPADM

## 2018-02-13 RX ADMIN — OXYCODONE HYDROCHLORIDE 5 MG: 5 TABLET ORAL at 06:48

## 2018-02-13 RX ADMIN — MORPHINE SULFATE 4 MG: 4 INJECTION INTRAVENOUS at 00:31

## 2018-02-13 RX ADMIN — VANCOMYCIN HYDROCHLORIDE 1400 MG: 100 INJECTION, POWDER, LYOPHILIZED, FOR SOLUTION INTRAVENOUS at 10:33

## 2018-02-13 RX ADMIN — CIPROFLOXACIN 400 MG: 2 INJECTION, SOLUTION INTRAVENOUS at 08:35

## 2018-02-13 RX ADMIN — MORPHINE SULFATE 4 MG: 4 INJECTION INTRAVENOUS at 04:31

## 2018-02-13 RX ADMIN — KETOROLAC TROMETHAMINE 10 MG: 10 TABLET, FILM COATED ORAL at 21:03

## 2018-02-13 RX ADMIN — OXYCODONE HYDROCHLORIDE 5 MG: 5 TABLET ORAL at 03:07

## 2018-02-13 RX ADMIN — ONDANSETRON 4 MG: 4 TABLET, ORALLY DISINTEGRATING ORAL at 23:35

## 2018-02-13 RX ADMIN — KETOROLAC TROMETHAMINE 10 MG: 10 TABLET, FILM COATED ORAL at 14:37

## 2018-02-13 ASSESSMENT — PAIN SCALES - GENERAL
PAINLEVEL_OUTOF10: 4
PAINLEVEL_OUTOF10: 5
PAINLEVEL_OUTOF10: 0

## 2018-02-13 NOTE — PROGRESS NOTES
AAOx4. Rt LEE drain patent.  Lft LEE drain leaking at site.  Reinforced dressing.  Clot in bulb impeding drainage, Dislodged and leaking stopped. Pain controled with alternating pain meds.  Up 1 assist to bathroom. No further needs at this time.  Will continue to monitor.

## 2018-02-13 NOTE — PROGRESS NOTES
"Pharmacy Kinetics 37 y.o. female on vancomycin day # 2 2018    Currently on Vancomycin 1400 mg iv q12hr    Indication for Treatment: empiric- r/o abdominal abscess     Pertinent history per medical record: Admitted on 2018 for severe and generalized abdominal pain. S/p hysterectomy performed by Dr. Jensen, OB-GYN, on 2017. Ct in ER showing bilateral pelvic sidewall fluid collections. Empiric antibiotics started for r/o abscess.      Other antibiotics:   cipro 400mg IV q12h     Allergies: Seroquel [quetiapine fumarate]      List concerns for renal function: obesity with BMI ~ 30     Pertinent cultures to date:   Pelvic fluid- R = no organisms seen  Pelvic fluid- L = no organisms seen    Recent Labs      182  18   0440   WBC  26.7*  19.1*   NEUTSPOLYS  89.50*  80.80*   BANDSSTABS  0.90   --      Recent Labs      18   0040   BUN  12  7*   CREATININE  0.79  0.70   ALBUMIN  3.3  2.5*     Recent Labs      18   0923   VANCOTROUGH  12.8     Intake/Output Summary (Last 24 hours) at 18 1121  Last data filed at 18 1047   Gross per 24 hour   Intake                0 ml   Output             1800 ml   Net            -1800 ml      Blood pressure 121/84, pulse 95, temperature 36.2 °C (97.1 °F), resp. rate 18, height 1.626 m (5' 4\"), weight 86.6 kg (190 lb 14.7 oz), last menstrual period 2018, SpO2 95 %, not currently breastfeeding. Temp (24hrs), Av.1 °C (98.7 °F), Min:36.2 °C (97.1 °F), Max:37.6 °C (99.6 °F)      A/P   1. Vancomycin dose change: none  2. Next vancomycin level: 3-4 days or sooner if indicated  3. Goal trough: 12-16mcg/mL  4. Comments:  Renal function appears stable with adequate voids. Afebrile with leukocytosis improving after antibiotic initiation. Vancomycin trough within therapeutic goal range. Continue current regimen. Rec discontinue vancomycin if MRSA can be ruled out. .Pharmacy will monitor/adjust as needed per protocol.     D. " Ronnie Salinas.

## 2018-02-14 VITALS
OXYGEN SATURATION: 100 % | BODY MASS INDEX: 32.59 KG/M2 | RESPIRATION RATE: 20 BRPM | HEART RATE: 84 BPM | TEMPERATURE: 97.9 F | DIASTOLIC BLOOD PRESSURE: 61 MMHG | SYSTOLIC BLOOD PRESSURE: 128 MMHG | WEIGHT: 190.92 LBS | HEIGHT: 64 IN

## 2018-02-14 LAB
BACTERIA UR CULT: NORMAL
SIGNIFICANT IND 70042: NORMAL
SITE SITE: NORMAL
SOURCE SOURCE: NORMAL

## 2018-02-14 PROCEDURE — 700111 HCHG RX REV CODE 636 W/ 250 OVERRIDE (IP): Performed by: SPECIALIST

## 2018-02-14 RX ADMIN — ONDANSETRON 4 MG: 4 TABLET, ORALLY DISINTEGRATING ORAL at 10:03

## 2018-02-14 RX ADMIN — ONDANSETRON 4 MG: 4 TABLET, ORALLY DISINTEGRATING ORAL at 04:35

## 2018-02-14 ASSESSMENT — ENCOUNTER SYMPTOMS
CHILLS: 0
SHORTNESS OF BREATH: 0
NAUSEA: 0
FEVER: 0
VOMITING: 0

## 2018-02-14 NOTE — PROGRESS NOTES
Gynecology Oncology Progress Note               Author: Joann Johnson Date & Time created: 2/14/2018  8:15 AM     Interval History:  Patient states she wants to stay another night because her  makes her more stressed. Her sister is able to take her home.  LEE x2 with serous fluid.    Review of Systems:  Review of Systems   Constitutional: Negative for chills and fever.   Respiratory: Negative for shortness of breath.    Cardiovascular: Negative for chest pain.   Gastrointestinal: Negative for nausea and vomiting.       Physical Exam:  Physical Exam   Constitutional: She is oriented to person, place, and time. She appears well-developed.   Pulmonary/Chest: Effort normal. No respiratory distress.   Abdominal: Soft.   LEE x 2 with serous fluid   Neurological: She is alert and oriented to person, place, and time.   Psychiatric: Her mood appears anxious.       Labs:        Invalid input(s): [id number]      Recent Labs      02/11/18 2312 02/13/18   0040   SODIUM  135  136   POTASSIUM  3.6  3.7   CHLORIDE  99  102   CO2  25  23   BUN  12  7*   CREATININE  0.79  0.70   CALCIUM  8.9  8.3*     Recent Labs      02/11/18 2312  02/13/18   0040   ALTSGPT  16  15   ASTSGOT  19  19   ALKPHOSPHAT  125*  137*   TBILIRUBIN  0.3  0.4   GLUCOSE  121*  91     Recent Labs      02/11/18 2312 02/13/18   0440   RBC  3.41*  3.14*   HEMOGLOBIN  10.6*  10.0*   HEMATOCRIT  32.2*  29.9*   PLATELETCT  689*  430     Recent Labs      02/11/18 2312 02/13/18   0040  02/13/18   0440   WBC  26.7*   --   19.1*   NEUTSPOLYS  89.50*   --   80.80*   LYMPHOCYTES  6.10*   --   6.40*   MONOCYTES  2.60   --   7.30   EOSINOPHILS  0.00   --   0.90   BASOPHILS  0.00   --   0.50   ASTSGOT  19  19   --    ALTSGPT  16  15   --    ALKPHOSPHAT  125*  137*   --    TBILIRUBIN  0.3  0.4   --      Recent Labs      02/11/18 2312 02/13/18   0040   SODIUM  135  136   POTASSIUM  3.6  3.7   CHLORIDE  99  102   CO2  25  23   GLUCOSE  121*  91   BUN  12   7*   CREATININE  0.79  0.70   CALCIUM  8.9  8.3*     Hemodynamics:  Temp (24hrs), Av.7 °C (98 °F), Min:36.3 °C (97.3 °F), Max:36.8 °C (98.3 °F)  Temperature: 36.6 °C (97.9 °F)  Pulse  Av.9  Min: 83  Max: 109   Blood Pressure: 128/61     Respiratory:    Respiration: 20, Pulse Oximetry: 100 %           Fluids:    Intake/Output Summary (Last 24 hours) at 18 0815  Last data filed at 18 0400   Gross per 24 hour   Intake                0 ml   Output              590 ml   Net             -590 ml        GI/Nutrition:  Orders Placed This Encounter   Procedures   • DIET ORDER     Standing Status:   Standing     Number of Occurrences:   1     Order Specific Question:   Diet:     Answer:   Regular [1]     Medical Decision Making, by Problem:  Active Hospital Problems    Diagnosis   • 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 Symptoms improved. Leukocytosis all cxs negative to date.  Patient to discharge home today, she does not want to go home to  as he brings her more stress, sister will take patient home.  She will follow up with primary care for refill on antipsychotic medications.  She will return to office 18 and monitor LEE output.    Discussed case with Dr. Jensen  Quality-Core Measures

## 2018-02-14 NOTE — PROGRESS NOTES
HD #1 doing better. Pain much better. Not requiring narcotics. Ambulating. Npo fever and chills.     VSS afebrile.   Skin warm   Abdomen incision looks good soft ND NT  Lee drain clear still with output.   Ext No edema NT  Labs noted. WBC 19K  Imp:  POD #12 S/P [facility] and pelvic node dissection now with pelvic fluid collection due to lymphocyst. Symptoms improved. Leukocytosis all cxs negative to date.   Plan: may d/c home continue with LEE drain until output < 150cc/24 hour.   Advised pt to monitor.   May D/C home and followup as planned on 02/21/2018.  Repeat CBC   Will prescribe toradol po for pain management and d/c home with one week supply.   Toradol escribed via [facility].

## 2018-02-14 NOTE — PROGRESS NOTES
Patient states she is ready for discharge. States leaving hospital will help to decrease anxiety level. Patient has scheduled a PCP appt for Friday to resume treatment for bipolar and anxiety. She displays ability to care for drains independently, Change dressing. Vomited x1 this AM which she attributes to anxiety related to significant other. She feels safe discharging to a friends house. Instructed to  toradol prescription. Educated on signs and symptoms and when to follow up with a provider/ER. No other complaints at this time.

## 2018-02-14 NOTE — CARE PLAN
Problem: Pain Management  Goal: Pain level will decrease to patient's comfort goal  Outcome: PROGRESSING AS EXPECTED  Po toradol with good effect    Problem: Psychosocial Needs:  Goal: Level of anxiety will decrease  Outcome: PROGRESSING SLOWER THAN EXPECTED

## 2018-02-14 NOTE — PROGRESS NOTES
Vomit x1.  Dr Jensen notified and order for Zofran adjusted to PO.  Administered.  No further nausea.  Will continue to monitor.

## 2018-02-14 NOTE — PROGRESS NOTES
Patient AAOx4. Will D/C tomorrow per ride not safe. LEE drains still draining.  Left drain still leaking, changing dressings as needed.  Teaching on drains done. Nausea x1. IV out, MD aware.  No further needs at this time.  Will continue to monitor.

## 2018-02-14 NOTE — DISCHARGE INSTRUCTIONS
Discharge Instructions    Discharged to home by car with friend. Discharged via walking, hospital escort: Yes.  Special equipment needed: Not Applicable    Be sure to schedule a follow-up appointment with your primary care doctor or any specialists as instructed.     Discharge Plan:   Diet Plan: Discussed  Activity Level: Discussed  Smoking Cessation Offered: Patient Refused  Confirmed Follow up Appointment: Appointment Scheduled  Confirmed Symptoms Management: Discussed  Medication Reconciliation Updated: Yes  Influenza Vaccine Indication: Patient Refuses    I understand that a diet low in cholesterol, fat, and sodium is recommended for good health. Unless I have been given specific instructions below for another diet, I accept this instruction as my diet prescription.   Other diet: Regular    Special Instructions: None    · Is patient discharged on Warfarin / Coumadin?   No     Depression / Suicide Risk    As you are discharged from this [facility] facility, it is important to learn how to keep safe from harming yourself.    Recognize the warning signs:  · Abrupt changes in personality, positive or negative- including increase in energy   · Giving away possessions  · Change in eating patterns- significant weight changes-  positive or negative  · Change in sleeping patterns- unable to sleep or sleeping all the time   · Unwillingness or inability to communicate  · Depression  · Unusual sadness, discouragement and loneliness  · Talk of wanting to die  · Neglect of personal appearance   · Rebelliousness- reckless behavior  · Withdrawal from people/activities they love  · Confusion- inability to concentrate     If you or a loved one observes any of these behaviors or has concerns about self-harm, here's what you can do:  · Talk about it- your feelings and reasons for harming yourself  · Remove any means that you might use to hurt yourself (examples: pills, rope, extension cords, firearm)  · Get professional help  from the community (Mental Health, Substance Abuse, psychological counseling)  · Do not be alone:Call your Safe Contact- someone whom you trust who will be there for you.  · Call your local CRISIS HOTLINE 587-8243 or 263-459-1323  · Call your local Children's Mobile Crisis Response Team [place] (833) 520-9813 or www.[employer].Com  · Call the toll free National Suicide Prevention Hotlines   · National Suicide Prevention Lifeline 673-903-NPNF (3094)  · [employer] Line Network 800-SUICIDE (668-6850)    Please take ibuprofen and Tylenol as needed for pain. You can take 6 mg of ibuprofen and 1000 mg of Tylenol every 6 hours as needed for pain control.     Please follow-up with your surgeon for further pain medications as needed.     Keep follow up appointment scheduled for 2/21/18. Call Dr. Jensen's office tomorrow to get a CBC scheduled to be drawn prior to appointment.     Call provider or return to the ER for Fever, nightsweats, chills, nausea/vomiting, pain not managed by toradol.     Toradol can be taken every 6 hours as needed for pain. This prescription was called to [facility] pharmacy.     Please keep track of output from LEE drains. Document output a minimum of 8am, 2pm 10pm daily.

## 2018-02-14 NOTE — PROGRESS NOTES
Per Dr. Jensen patient is safe to discharge at this time. LEE Drains to remain in place until follow up appointment. Patient has displayed understanding and ability to manage caring for drains independently this shift. Patient states she feels comfortable discharging at this time. IV removed. Patient ambulating about unit independently. Pain managed with PO toradol.

## 2018-02-15 LAB
BACTERIA FLD AEROBE CULT: NORMAL
BACTERIA FLD AEROBE CULT: NORMAL
GRAM STN SPEC: NORMAL
GRAM STN SPEC: NORMAL
SIGNIFICANT IND 70042: NORMAL
SIGNIFICANT IND 70042: NORMAL
SITE SITE: NORMAL
SITE SITE: NORMAL
SOURCE SOURCE: NORMAL
SOURCE SOURCE: NORMAL

## 2018-02-16 ENCOUNTER — OFFICE VISIT (OUTPATIENT)
Dept: MEDICAL GROUP | Facility: MEDICAL CENTER | Age: 38
End: 2018-02-16
Attending: FAMILY MEDICINE
Payer: MEDICAID

## 2018-02-16 VITALS
OXYGEN SATURATION: 97 % | DIASTOLIC BLOOD PRESSURE: 76 MMHG | HEIGHT: 64 IN | SYSTOLIC BLOOD PRESSURE: 115 MMHG | WEIGHT: 181 LBS | RESPIRATION RATE: 18 BRPM | HEART RATE: 100 BPM | BODY MASS INDEX: 30.9 KG/M2 | TEMPERATURE: 96.9 F

## 2018-02-16 DIAGNOSIS — C53.1 MALIGNANT NEOPLASM OF EXOCERVIX (HCC): ICD-10-CM

## 2018-02-16 DIAGNOSIS — F41.9 ANXIETY: ICD-10-CM

## 2018-02-16 PROCEDURE — 99203 OFFICE O/P NEW LOW 30 MIN: CPT | Performed by: FAMILY MEDICINE

## 2018-02-16 RX ORDER — LAMOTRIGINE 100 MG/1
100 TABLET ORAL DAILY
Qty: 30 TAB | Refills: 1 | Status: SHIPPED | OUTPATIENT
Start: 2018-02-16 | End: 2018-02-16 | Stop reason: SDUPTHER

## 2018-02-16 RX ORDER — HYDROXYZINE HYDROCHLORIDE 25 MG/1
25 TABLET, FILM COATED ORAL 3 TIMES DAILY PRN
Qty: 30 TAB | Refills: 1 | Status: SHIPPED | OUTPATIENT
Start: 2018-02-16 | End: 2018-02-16 | Stop reason: SDUPTHER

## 2018-02-16 RX ORDER — HYDROXYZINE HYDROCHLORIDE 25 MG/1
25 TABLET, FILM COATED ORAL 3 TIMES DAILY PRN
Qty: 30 TAB | Refills: 1 | Status: SHIPPED | OUTPATIENT
Start: 2018-02-16 | End: 2021-12-03

## 2018-02-16 RX ORDER — LAMOTRIGINE 100 MG/1
100 TABLET ORAL DAILY
Qty: 30 TAB | Refills: 1 | Status: SHIPPED | OUTPATIENT
Start: 2018-02-16 | End: 2018-07-09 | Stop reason: SDUPTHER

## 2018-02-16 NOTE — PROGRESS NOTES
CC: Anxiety. I need my medication    HPI:  New patient  Korina presents today  to establish care, 37 years old female with past medical history significant for recent diagnosis of cervical cancer, underwent radical hysterectomy, history of bipolar disorder and anxiety disorder, lost her PCP recently and psychiatrist service because of change in insurance, patient reviewed with me. Her past medical problems. Past surgical history, family/social history, medications, as part of her establishing care visit today and discussed following concerns. Follow today    Recently diagnosed with cervical cancer, status post radical hysterectomy:  Reviewed records, patient surgeon/gynecologist is Dr. Jensen, underwent radical hysterectomy for cervical cancer treatment, then patient went back to the hospital for CT scan guided drainage placement because of fluid attenuation in the pelvic region. Today, she said she's been feeling fine. No severe pain, and the drain is not producing any fluids at this time. She has an appointment to follow-up with her gynecologist/surgeon in 5 days, reviewed pathology results, which confirms the presence of cervical cancer   Anxiety   Reports having an anxiety specially that she has not been taking her medication. She was on Lamictal 100 mg daily, patient said she has been using marijuana on daily basis to calm herself down  Patient gives a long history of bipolar disorder and anxiety. Used to see [facility] for her psychiatric care    Patient Active Problem List    Diagnosis Date Noted   • Lymph cyst 02/13/2018   • Cervical cancer (CMS-HCC) 02/12/2018   • Postoperative pain 02/12/2018       Current Outpatient Prescriptions   Medication Sig Dispense Refill   • lamoTRIgine (LAMICTAL) 100 MG Tab Take 1 Tab by mouth every day. 30 Tab 1   • hydrOXYzine HCl (ATARAX) 25 MG Tab Take 1 Tab by mouth 3 times a day as needed for Anxiety. 30 Tab 1     No current facility-administered  medications for this visit.          Allergies as of 02/16/2018 - Reviewed 02/16/2018   Allergen Reaction Noted   • Seroquel [quetiapine fumarate]  12/15/2017        ROS: Denies any chest pain, Shortness of breath, Changes bowel or bladder, Lower extremity edema.    Physical Exam:  Gen.: Well-developed, well-nourished, no apparent distress,pleasant , yet look anxious, and cooperative with the examination  Skin:  Warm and dry with good turgor. No rashes or suspicious lesions in visible areas  Eye: PERRLA, conjunctiva and sclera clear, lids normal  HEENT: Normocephalic/atraumatic, sinuses nontender with palpation, TMs clear, nares patent with pink mucosa and clear rhinorrhea, lips without lesions, oropharynx clear.  Neck: Trachea midline,no masses or adenopathy  Thyroid: normal consistency and size. No masses or nodules. Not tender with palpation.  Cor: Regular rate and rhythm without murmur, gallop or rub.  Lungs: Respirations unlabored.Clear to auscultation with equal breath sounds bilaterally. No wheezes, rhonchi.  Abdomen: Soft. Mild discomfort on abdominal palpation related to her recent surgery, drain is in place. No fluid noted in her drain. Without hepatosplenomegaly or masses appreciated, normoactive bowel sounds. No hernias.  Extremities: No cyanosis, clubbing or edema, Symmetrical without deformities or malformations. Pulses 2+ and symmetrical both upper and lower extremities  Lymphatic: No abnormal adenopathy of the neck groin or axillae.  Psych: Alert and oriented x 3.Normal affect, judgement,insight and memory.        Assessment and Plan.   37 y.o. female, establish care    1. Malignant neoplasm of exocervix (CMS-HCC)  Follow-up with Dr. Jensen as directed, status post radical hysterectomy. Patient is clinically stable with stable vital signs at this time    2. Anxiety   Long history of bipolar disorder  Check thyroid function, plus referral to see psychiatry urgently because of her uncontrolled anxiety  symptoms. Refilled Lamictal, and I will add hydroxyzine to use as needed. No suicidal ideations  - REFERRAL TO PSYCHIATRY  - TSH; Future  - FREE THYROXINE; Future  - lamoTRIgine (LAMICTAL) 100 MG Tab; Take 1 Tab by mouth every day.  Dispense: 30 Tab; Refill: 1  - hydrOXYzine HCl (ATARAX) 25 MG Tab; Take 1 Tab by mouth 3 times a day as needed for Anxiety.  Dispense: 30 Tab; Refill: 1      Please note that this dictation was created using voice recognition software. I have made every reasonable attempt to correct obvious errors but there may be errors of grammar and content that I may have overlooked prior to finalization of this note.

## 2018-02-16 NOTE — LETTER
[address] [address]  [phone]     February 16, 2018    Patient: Korina Lyons   YOB: 1980   Date of Visit: 2/16/2018       To Whom It May Concern:    Korina Lyons was seen and treated in our department on 2/16/2018.     Sincerely,     Naila Nuñez, Med Ass't

## 2018-03-01 ENCOUNTER — HOSPITAL ENCOUNTER (OUTPATIENT)
Dept: RADIOLOGY | Facility: MEDICAL CENTER | Age: 38
End: 2018-03-01
Attending: SPECIALIST
Payer: MEDICAID

## 2018-03-01 DIAGNOSIS — C53.9 MALIGNANT NEOPLASM OF CERVIX, UNSPECIFIED SITE (HCC): ICD-10-CM

## 2018-03-01 PROCEDURE — 700117 HCHG RX CONTRAST REV CODE 255: Performed by: SPECIALIST

## 2018-03-01 PROCEDURE — 74177 CT ABD & PELVIS W/CONTRAST: CPT

## 2018-03-01 RX ADMIN — IOHEXOL 100 ML: 350 INJECTION, SOLUTION INTRAVENOUS at 10:45

## 2018-03-01 RX ADMIN — IOHEXOL 50 ML: 240 INJECTION, SOLUTION INTRATHECAL; INTRAVASCULAR; INTRAVENOUS; ORAL at 10:45

## 2018-03-03 ENCOUNTER — HOSPITAL ENCOUNTER (EMERGENCY)
Facility: MEDICAL CENTER | Age: 38
End: 2018-03-03
Attending: EMERGENCY MEDICINE
Payer: MEDICAID

## 2018-03-03 VITALS
OXYGEN SATURATION: 97 % | BODY MASS INDEX: 31.11 KG/M2 | WEIGHT: 181.22 LBS | SYSTOLIC BLOOD PRESSURE: 126 MMHG | RESPIRATION RATE: 16 BRPM | TEMPERATURE: 97.9 F | DIASTOLIC BLOOD PRESSURE: 75 MMHG | HEART RATE: 81 BPM

## 2018-03-03 DIAGNOSIS — Z48.03 ENCOUNTER FOR CHANGE OR REMOVAL OF DRAINS: ICD-10-CM

## 2018-03-03 PROCEDURE — 99283 EMERGENCY DEPT VISIT LOW MDM: CPT

## 2018-03-03 ASSESSMENT — PAIN SCALES - GENERAL: PAINLEVEL_OUTOF10: 0

## 2018-03-03 NOTE — ED TRIAGE NOTES
Korina Dumas Serena  37 y.o. female  Chief Complaint   Patient presents with   • Open Wound     Pt. states she accidentally pulled out the brynn drain on the left side of her abdomen this morning when removing her and putting on her clothing. Pt. denies any pain and only has slightl serosanguanous drainage.     /89   Pulse 88   Temp 36.6 °C (97.9 °F) (Temporal)   Resp 18   Wt 82.2 kg (181 lb 3.5 oz)   LMP 01/20/2018   SpO2 100%   BMI 31.11 kg/m²     Pt amb to triage with steady gait for above complaint. Pt. States she is not receiving chemotherapy or radiation.  Pt is alert and oriented, speaking in full sentences, follows commands and responds appropriately to questions. NAD. Resp are even and unlabored.  Pt placed in lobby. Pt educated on triage process. Pt encouraged to alert staff for any changes.

## 2018-03-03 NOTE — DISCHARGE INSTRUCTIONS
See Dr. Jensen on Monday for removal of drain  Return to ER for pain, fever, drainage or other concerns      Bulb Drain Home Care  A bulb drain consists of a thin rubber tube and a soft, round bulb that creates a gentle suction. The rubber tube is placed in the area where you had surgery. A bulb is attached to the end of the tube that is outside the body. The bulb drain removes excess fluid that normally builds up in a surgical wound after surgery. The color and amount of fluid will vary. Immediately after surgery, the fluid is bright red and is a little thicker than water. It may gradually change to a yellow or pink color and become more thin and water-like. When the amount decreases to about 1 or 2 tbsp in 24 hours, your health care provider will usually remove it.  DAILY CARE  · Keep the bulb flat (compressed) at all times, except while emptying it. The flatness creates suction. You can flatten the bulb by squeezing it firmly in the middle and then closing the cap.  · Keep sites where the tube enters the skin dry and covered with a bandage (dressing).  · Secure the tube 1-2 in (2.5-5.1 cm) below the insertion sites to keep it from pulling on your stitches. The tube is stitched in place and will not slip out.  · Secure the bulb as directed by your health care provider.  · For the first 3 days after surgery, there usually is more fluid in the bulb. Empty the bulb whenever it becomes half full because the bulb does not create enough suction if it is too full. The bulb could also overflow. Write down how much fluid you remove each time you empty your drain. Add up the amount removed in 24 hours.  · Empty the bulb at the same time every day once the amount of fluid decreases and you only need to empty it once a day. Write down the amounts and the 24-hour totals to give to your health care provider. This helps your health care provider know when the tubes can be removed.  EMPTYING THE BULB DRAIN  Before emptying the bulb,  get a measuring cup, a piece of paper and a pen, and wash your hands.  · Gently run your fingers down the tube (stripping) to empty any drainage from the tubing into the bulb. This may need to be done several times a day to clear the tubing of clots and tissue.  · Open the bulb cap to release suction, which causes it to inflate. Do not touch the inside of the cap.  · Gently run your fingers down the tube (stripping) to empty any drainage from the tubing into the bulb.  · Hold the cap out of the way, and pour fluid into the measuring cup.    · Squeeze the bulb to provide suction.   · Replace the cap.    · Check the tape that holds the tube to your skin. If it is becoming loose, you can remove the loose piece of tape and apply a new one. Then, pin the bulb to your shirt.    · Write down the amount of fluid you emptied out. Write down the date and each time you emptied your bulb drain. (If there are 2 bulbs, note the amount of drainage from each bulb and keep the totals separate. Your health care provider will want to know the total amounts for each drain and which tube is draining more.)    · Flush the fluid down the toilet and wash your hands.    · Call your health care provider once you have less than 2 tbsp of fluid collecting in the bulb drain every 24 hours.  If there is drainage around the tube site, change dressings and keep the area dry. Cleanse around tube with sterile saline and place dry gauze around site. This gauze should be changed when it is soiled. If it stays clean and unsoiled, it should still be changed daily.   SEEK MEDICAL CARE IF:  · Your drainage has a bad smell or is cloudy.    · You have a fever.    · Your drainage is increasing instead of decreasing.    · Your tube fell out.    · You have redness or swelling around the tube site.    · You have drainage from a surgical wound.    · Your bulb drain will not stay flat after you empty it.    MAKE SURE YOU:   · Understand these  instructions.  · Will watch your condition.  · Will get help right away if you are not doing well or get worse.     This information is not intended to replace advice given to you by your health care provider. Make sure you discuss any questions you have with your health care provider.     Document Released: 12/15/2001 Document Revised: 01/08/2016 Document Reviewed: 05/22/2013  [employer] Interactive Patient Education ©2016 [employer]

## 2018-03-03 NOTE — ED PROVIDER NOTES
ED Provider Note    CHIEF COMPLAINT  Chief Complaint   Patient presents with   • Open Wound     Pt. states she accidentally pulled out the brynn drain on the left side of her abdomen this morning when removing her and putting on her clothing. Pt. denies any pain and only has slightl serosanguanous drainage.       HPI  Korina Lyons is a 37 y.o. female who presents for evaluation after she pulled her BRYNN drain out of the left lower surgical incision that is present there. She states she had cervical cancer and surgery with Dr. Jensen several weeks ago.    She states the BRYNN drains over the last several days have been putting out 10-20 mL's of serosanguineous fluid. She denies abdominal pain, fever, chills, vomiting. She states she feels she is healing well but had a CT scan as an outpatient on Thursday to evaluate for an ongoing fluid collection. She does not know the results of this yet.      ALLERGIES  Allergies   Allergen Reactions   • Seroquel [Quetiapine Fumarate]      Restless leg         CURRENT MEDICATIONS  Lamictal  Atarax    PAST MEDICAL HISTORY   has a past medical history of Bipolar 1 disorder (CMS-HCC); Cancer (CMS-HCC) (2017); and Psychiatric problem.    SURGICAL HISTORY   has a past surgical history that includes dilation and curettage (12/17/2017); hysterectomy robotic xi (2/1/2018); salpingectomy (Bilateral, 2/1/2018); oophorectomy (Bilateral, 2/1/2018); and cystoscopy stent placement (Bilateral, 2/1/2018).    SOCIAL HISTORY  Social History     Social History Main Topics   • Smoking status: Former Smoker     Packs/day: 0.10     Years: 27.00     Types: Cigarettes     Quit date: 1/1/2018   • Smokeless tobacco: Never Used   • Alcohol use Yes      Comment: 0-1 per month   • Drug use: Yes     Types: Inhaled, Marijuana      Comment: Marijuana daily.  Last smoked 1/28/2018   • Sexual activity: Yes     Partners: Male         REVIEW OF SYSTEMS  Patient admits to problems with her BRYNN drain   Pt denies fever,  chills, vomiting, pain  See HPI for further details.       PHYSICAL EXAM  VITAL SIGNS: /89   Pulse 88   Temp 36.6 °C (97.9 °F) (Temporal)   Resp 18   Wt 82.2 kg (181 lb 3.5 oz)   LMP 01/20/2018   SpO2 100%   BMI 31.11 kg/m²     General:  WDWN, nontoxic appearing in NAD; A+Ox3; V/S as above  Skin: warm and dry; good color; no rash  HEENT: NCAT; EOMs intact; PERRL; no scleral icterus   Neck: FROM; supple  Cardiovascular: Regular heart rate and rhythm.  No murmurs, rubs, or gallops; pulses 2+ bilaterally radially  Lungs: Clear to auscultation with good air movement bilaterally.  No wheezes, rhonchi, or rales.   Abdomen: BS present; soft; nontender; healing surgical incisions present over the abdominal wall without drainage or swelling  Extremities: HERNANDEZ x 4; no e/o trauma; no pedal edema  Neurologic: CNs III-XII grossly intact; speech clear; distal sensation intact; strength 5/5 UE/LEs; gait steady  Psychiatric: Appropriate affect, normal mood      MEDICAL RECORD  I have reviewed the patient's medical record and pertinent results as listed.      COURSE & MEDICAL DECISION MAKING  I have reviewed any medical record information, laboratory studies and radiographic results as noted.    Korina Lyons is a 37 y.o. female who presents for evaluation after accidentally removing her LEE drain from her left abdominal wound. Patient is afebrile and has a soft, nontender abdomen. I reviewed the patient's CT results from Thursday. Blood work is not indicated at this time.    8:22 AM  Discussed with DAQUAN Villanueva working with Dr. Jensen who will discuss the case with him.    Dr. Jensen returned my call and will see the patient in his office on Monday or Tuesday. He is aware of the CT results from Thursday and feels the patient is improving. No concern for abscess and feels this is a lymphocele. Patient will bring the LEE drain that was accidentally removed to her appointment with Dr. Jensen so he can evaluate for retained  foreign body. Patient was advised of this. Patient was given return precautions including fever, pain, vomiting, or other concerns.    Pt's blood pressure was noted to be above 120/80 here in the ER.  Pt was informed and advised to follow up as an outpatient for recheck for possible dx/management of hypertension.      FINAL IMPRESSION  1. Encounter for change or removal of drains           Electronically signed by: Mariaelena Nair, 3/3/2018 8:01 AM

## 2018-03-04 ENCOUNTER — PATIENT OUTREACH (OUTPATIENT)
Dept: HEALTH INFORMATION MANAGEMENT | Facility: OTHER | Age: 38
End: 2018-03-04

## 2018-03-08 NOTE — DISCHARGE SUMMARY
ADMITTING DIAGNOSIS:  Stage 1B2 cervical cancer, desiring definitive surgical   therapy.     DISCHARGE DIAGNOSIS:  Cervical cancer.    HPI & HOSPITAL COURSE  This is a 37 y.o. female here with postoperative pain.  She has a past medical history significant for cervical dysplasia. She was in her usual state of health until she was evaluated by her PMD, she had a 1 1/2 year hx of PCP, no IMB, denies and vaginal discharge and had a pap smear with Dr. Kaufman and was noted to have an ASCUS/+ HRHPV pap. She presented to Dr. Keating and was noted to have a 5 cm lesion which, bx showed AIS and invasive adenocarcinoma. Patient denies any change in bowel function or IN bleeding, she continues to have increased urinary frequency. She underwent  RNSRH and pelvic node dissection on 2/1 and represented to ER 2/10 with abdominal pain and difficulty sleeping. CT with pelvic fluid collection due to lymphocyst and wbc was 26.7 on admission. IR placed 2 LEE drains and abdominal pain had resolved. She was discharged home with LEE drains and instructed to follow up in office.    The patient met 2-midnight criteria for an inpatient stay at the time of discharge.    Therefore, she is discharged in good and stable condition with close outpatient follow-up.    SPECIFIC OUTPATIENT FOLLOW-UP  [facility]    DISCHARGE INSTRUCTIONS:  1.  No heavy lifting greater than 10 pounds for another 6 weeks, no driving   while taking pain medication.  2.  Nothing in the vagina for 6 weeks.  3.  The patient is to call [phone] if any questions or concerns to confirm   postoperative appointment.       DISCHARGE PROBLEM LIST  Active Problems:    Cervical cancer (CMS-HCC) POA: Unknown    Postoperative pain POA: Yes    Lymph cyst POA: Unknown  Resolved Problems:    * No resolved hospital problems. *      FOLLOW UP  Future Appointments  Date Time Provider Department Center   3/16/2018 9:30 AM Gil Patel M.D. [facility]     Pcp Pt States  None    Schedule an appointment as soon as possible for a visit        MEDICATIONS ON DISCHARGE  There are no discharge medications for this patient.       DIET  No orders of the defined types were placed in this encounter.      ACTIVITY  As tolerated.  Weight bearing as tolerated      LABORATORY  Lab Results   Component Value Date/Time    SODIUM 136 02/13/2018 12:40 AM    POTASSIUM 3.7 02/13/2018 12:40 AM    CHLORIDE 102 02/13/2018 12:40 AM    CO2 23 02/13/2018 12:40 AM    GLUCOSE 91 02/13/2018 12:40 AM    BUN 7 (L) 02/13/2018 12:40 AM    CREATININE 0.70 02/13/2018 12:40 AM        Lab Results   Component Value Date/Time    WBC 19.1 (H) 02/13/2018 04:40 AM    HEMOGLOBIN 10.0 (L) 02/13/2018 04:40 AM    HEMATOCRIT 29.9 (L) 02/13/2018 04:40 AM    PLATELETCT 430 02/13/2018 04:40 AM        Total time of the discharge process exceeds 32 minutes

## 2018-03-16 ENCOUNTER — OFFICE VISIT (OUTPATIENT)
Dept: MEDICAL GROUP | Facility: MEDICAL CENTER | Age: 38
End: 2018-03-16
Attending: FAMILY MEDICINE
Payer: MEDICAID

## 2018-03-16 VITALS
SYSTOLIC BLOOD PRESSURE: 110 MMHG | RESPIRATION RATE: 16 BRPM | TEMPERATURE: 98.3 F | WEIGHT: 178 LBS | DIASTOLIC BLOOD PRESSURE: 66 MMHG | BODY MASS INDEX: 30.39 KG/M2 | HEART RATE: 100 BPM | HEIGHT: 64 IN | OXYGEN SATURATION: 98 %

## 2018-03-16 DIAGNOSIS — Z90.710 HISTORY OF RADICAL HYSTERECTOMY: ICD-10-CM

## 2018-03-16 DIAGNOSIS — R20.2 NUMBNESS AND TINGLING IN RIGHT HAND: ICD-10-CM

## 2018-03-16 DIAGNOSIS — R20.0 NUMBNESS AND TINGLING IN RIGHT HAND: ICD-10-CM

## 2018-03-16 DIAGNOSIS — C53.1 MALIGNANT NEOPLASM OF EXOCERVIX (HCC): ICD-10-CM

## 2018-03-16 PROCEDURE — 99213 OFFICE O/P EST LOW 20 MIN: CPT | Performed by: FAMILY MEDICINE

## 2018-03-16 ASSESSMENT — PAIN SCALES - GENERAL: PAINLEVEL: NO PAIN

## 2018-03-16 NOTE — PROGRESS NOTES
Chief Complaint:   Chief Complaint   Patient presents with   • Cervical Cancer     follow up    • Tingling     in rt fingers and hand       HPI:Established patient   Korina Lyons is a 37 y.o. female who presents evaluation of numbness on the right hand fingers, also follow-up on her recent postoperative for treatment of cervical cancer    Malignant neoplasm of exocervix (CMS-HCC)    Continues to follow-up with Dr. Jensen, gynecologist oncologist, patient was recently diagnosed with cervical cancer, status post radical hysterectomy and salpingectomy, patient states she's been recovering well. Denies abdominal pain, drains were removed by her surgeon and she has another follow-up with him soon  History of radical hysterectomy  Drains after surgery were removed. Recovering well at this time. Vital signs are stable    Numbness and tingling in right hand   Reports that for at least one year. Now she's been having this tingling sensation in her right middle and ring finger without hand pain or numbness, denies any neck pain or shoulder pain or any upper arm pain. Patient said it started likely after she possibly tapped very hard on a table with her right hand and it did not resolve its persistent. They're all the time, does not use any wrist brace.          Past medical history, family history, social history and medications reviewed and updated in the record. Today   Current medications, problem list and allergies reviewed in [facility] today   Health maintenance topics are reviewed and updated. Today     Patient Active Problem List    Diagnosis Date Noted   • History of radical hysterectomy 03/16/2018   • Lymph cyst 02/13/2018   • Cervical cancer (CMS-HCC) 02/12/2018   • Postoperative pain 02/12/2018     Family History   Problem Relation Age of Onset   • Cancer Maternal Aunt      ovary   • Cancer Maternal Uncle      ?   • Genetic Maternal Grandfather      lung   • Cancer Maternal Grandfather      lung     Social History  "    Social History   • Marital status: Single     Spouse name: N/A   • Number of children: N/A   • Years of education: N/A     Occupational History   • Not on file.     Social History Main Topics   • Smoking status: Former Smoker     Packs/day: 0.10     Years: 27.00     Types: Cigarettes     Quit date: 1/1/2018   • Smokeless tobacco: Never Used   • Alcohol use Yes      Comment: 0-1 per month   • Drug use: Yes     Types: Inhaled, Marijuana      Comment: Marijuana daily.  Last smoked 1/28/2018   • Sexual activity: Yes     Partners: Male     Other Topics Concern   • Not on file     Social History Narrative   • No narrative on file     Current Outpatient Prescriptions   Medication Sig Dispense Refill   • lamoTRIgine (LAMICTAL) 100 MG Tab Take 1 Tab by mouth every day. 30 Tab 1   • hydrOXYzine HCl (ATARAX) 25 MG Tab Take 1 Tab by mouth 3 times a day as needed for Anxiety. 30 Tab 1     No current facility-administered medications for this visit.            Review Of Systems  As documented in HPI above  PHYSICAL EXAMINATION:    /66   Pulse 100   Temp 36.8 °C (98.3 °F)   Resp 16   Ht 1.626 m (5' 4.02\")   Wt 80.7 kg (178 lb)   LMP 01/20/2018   SpO2 98%   BMI 30.54 kg/m²   Gen.: Well-developed, well-nourished, no apparent distress, pleasant and cooperative with the examination  HEENT: Normocephalic/atraumatic,       Abdomen: Soft nontender without hepatosplenomegaly or masses appreciated, normoactive bowel sounds  Extremities: No cyanosis, clubbing or edema    right hand exam within normal limits without evidence of deformity or swelling, wrist to wrist exam would not induce any numbness. The numbness is persistent. As per patient history      ASSESSMENT/Plan:    1. Malignant neoplasm of exocervix (CMS-HCC)  Recovering well, follow up with oncologist as directed    2. History of radical hysterectomy  Stable and recovering well after surgery with preserved ovaries. No need for hormone replacement therapy    3. " Numbness and tingling in right hand  , Possibly nerve damage, unlikely carpal tunnel, use hand brace to see if that can help. Do nerve conduction study for further evaluation.    REFERRAL TO NEURODIAGNOSTICS (EEG,EP,EMG/NCS/DBS) Modality Requested: EMG-Comment Extremities    VITAMIN B12       Please note that this dictation was created using voice recognition software. I have made every reasonable attempt to correct obvious errors but there may be errors of grammar and content that I may have overlooked prior to finalization of this note.

## 2018-05-11 ENCOUNTER — OFFICE VISIT (OUTPATIENT)
Dept: MEDICAL GROUP | Facility: MEDICAL CENTER | Age: 38
End: 2018-05-11
Attending: FAMILY MEDICINE
Payer: MEDICAID

## 2018-05-11 VITALS
HEIGHT: 64 IN | OXYGEN SATURATION: 100 % | WEIGHT: 178 LBS | HEART RATE: 88 BPM | BODY MASS INDEX: 30.39 KG/M2 | TEMPERATURE: 97.2 F | SYSTOLIC BLOOD PRESSURE: 115 MMHG | DIASTOLIC BLOOD PRESSURE: 75 MMHG | RESPIRATION RATE: 18 BRPM

## 2018-05-11 DIAGNOSIS — M54.9 ACUTE BACK PAIN, UNSPECIFIED BACK LOCATION, UNSPECIFIED BACK PAIN LATERALITY: ICD-10-CM

## 2018-05-11 DIAGNOSIS — C53.1 MALIGNANT NEOPLASM OF EXOCERVIX (HCC): ICD-10-CM

## 2018-05-11 PROCEDURE — 99214 OFFICE O/P EST MOD 30 MIN: CPT | Performed by: FAMILY MEDICINE

## 2018-05-11 PROCEDURE — 99213 OFFICE O/P EST LOW 20 MIN: CPT | Performed by: FAMILY MEDICINE

## 2018-05-11 RX ORDER — NAPROXEN 500 MG/1
500 TABLET ORAL
Qty: 20 TAB | Refills: 0 | Status: SHIPPED | OUTPATIENT
Start: 2018-05-11 | End: 2018-05-11 | Stop reason: SDUPTHER

## 2018-05-11 RX ORDER — NAPROXEN 500 MG/1
500 TABLET ORAL
Qty: 20 TAB | Refills: 0 | Status: SHIPPED | OUTPATIENT
Start: 2018-05-11 | End: 2021-07-05

## 2018-05-11 RX ORDER — BACLOFEN 10 MG/1
10 TABLET ORAL
Qty: 15 TAB | Refills: 0 | Status: SHIPPED | OUTPATIENT
Start: 2018-05-11 | End: 2018-05-11 | Stop reason: SDUPTHER

## 2018-05-11 RX ORDER — BACLOFEN 10 MG/1
10 TABLET ORAL
Qty: 15 TAB | Refills: 0 | Status: SHIPPED | OUTPATIENT
Start: 2018-05-11 | End: 2021-12-03

## 2018-05-11 ASSESSMENT — PATIENT HEALTH QUESTIONNAIRE - PHQ9: CLINICAL INTERPRETATION OF PHQ2 SCORE: 0

## 2018-05-11 NOTE — PROGRESS NOTES
Chief Complaint:   Chief Complaint   Patient presents with   • Back Pain       HPI:Established patient   Korina Lyons is a 37 y.o. female who presents for evaluation of acute back pain     Acute back pain, unspecified back location, unspecified back pain laterality   Patient said 3 weeks ago she moved to another house and she was doing a lot of packing and carrying boxes, while she was carrying some of the boxes overhead, some of them fell on her back. She started to have the back pain ever since, she said the pack. Pain is located mostly on the upper back area associated with muscle spasms, especially at nighttime, denies numbness or tingling sensation or weakness in the upper extremities, denies neck pain   Denies fever   bleeding with sex  Patient recently was diagnosed with cervical cancer in February, she underwent hysterectomy, still continues to follow-up with Dr. Jensen. She said she noted that she has some spotting after sex without pain, the bleeding usually stops spontaneously. She called Dr. Jensen office and she was scheduled to see him in 2 weeks. Patient denies lower abdominal pain or continuous bleeding. She said she still feels sore and with some pelvic discomfort. She feels she did not heal completely.            Past medical history, family history, social history and medications reviewed and updated in the record. Today  Current medications, problem list and allergies reviewed in [facility] . Today  Health maintenance topics are reviewed and updated. Today    Patient Active Problem List    Diagnosis Date Noted   • History of radical hysterectomy 03/16/2018   • Lymph cyst 02/13/2018   • Cervical cancer (HCC) 02/12/2018   • Postoperative pain 02/12/2018     Family History   Problem Relation Age of Onset   • Cancer Maternal Aunt      ovary   • Cancer Maternal Uncle      ?   • Genetic Maternal Grandfather      lung   • Cancer Maternal Grandfather      lung     Social History     Social History   •  "Marital status: Single     Spouse name: N/A   • Number of children: N/A   • Years of education: N/A     Occupational History   • Not on file.     Social History Main Topics   • Smoking status: Former Smoker     Packs/day: 0.10     Years: 27.00     Types: Cigarettes     Quit date: 1/1/2018   • Smokeless tobacco: Never Used   • Alcohol use Yes      Comment: 0-1 per month   • Drug use: Yes     Types: Inhaled, Marijuana      Comment: Marijuana daily.  Last smoked 1/28/2018   • Sexual activity: Yes     Partners: Male     Other Topics Concern   • Not on file     Social History Narrative   • No narrative on file     Current Outpatient Prescriptions   Medication Sig Dispense Refill   • lamoTRIgine (LAMICTAL) 100 MG Tab Take 1 Tab by mouth every day. 30 Tab 1   • hydrOXYzine HCl (ATARAX) 25 MG Tab Take 1 Tab by mouth 3 times a day as needed for Anxiety. 30 Tab 1     No current facility-administered medications for this visit.            Review Of Systems  As documented in HPI above  PHYSICAL EXAMINATION:    /75   Pulse 88   Temp 36.2 °C (97.2 °F)   Resp 18   Ht 1.626 m (5' 4\")   Wt 80.7 kg (178 lb)   LMP 01/20/2018   SpO2 100%   BMI 30.55 kg/m²   Gen.: Well-developed, well-nourished, no apparent distress, pleasant and cooperative with the examination  HEENT: Normocephalic/atraumatic, sinuses nontender with palpation, TMs clear, nares patent with pink mucosa and clear rhinorrhea, oropharynx clear  Neck: No JVD or bruits, no adenopathy  Cor: Regular rate and rhythm without murmur gallop or rub  Lungs: Clear to auscultation with equal breath sounds bilaterally. No wheezes, rhonchi.  Abdomen: Soft nontender without hepatosplenomegaly or masses appreciated, normoactive bowel sounds  Extremities: No cyanosis, clubbing or edema     Back exam without evidence of tenderness or deformity or numbness or change in sensation      ASSESSMENT/Plan:  1. Acute back pain, unspecified back location, unspecified back pain " laterality  Acute pain, likely related to muscular causes advised to use baclofen and naproxen as needed and directed if not resolved to come back for reassessment and possible x-ray and possible referral to physical therapy    2. Malignant neoplasm of exocervix (HCC)  , Status post hysterectomy. Reports postcoital bleeding    Patient advised to use a lot of lubrication during sex and to take it easy and follow-up with gynecology as directed. No active bleeding at this time        Please note that this dictation was created using voice recognition software. I have made every reasonable attempt to correct obvious errors but there may be errors of grammar and content that I may have overlooked prior to finalization of this note.

## 2018-06-19 ENCOUNTER — TELEPHONE (OUTPATIENT)
Dept: MEDICAL GROUP | Facility: MEDICAL CENTER | Age: 38
End: 2018-06-19

## 2018-06-19 NOTE — TELEPHONE ENCOUNTER
Patient called and requested a refill on her   lamoTRIgine (LAMICTAL) 100 MG Tab and the hydrOXYzine HCl (ATARAX) 25 MG Tab. She is low on her anxiety medication and will be out by the end of the week.

## 2018-06-28 ENCOUNTER — HOSPITAL ENCOUNTER (OUTPATIENT)
Dept: LAB | Facility: MEDICAL CENTER | Age: 38
End: 2018-06-28
Attending: SPECIALIST
Payer: MEDICAID

## 2018-06-29 LAB — CYTOLOGY REG CYTOL: NORMAL

## 2018-07-09 DIAGNOSIS — F41.9 ANXIETY: ICD-10-CM

## 2018-07-10 RX ORDER — LAMOTRIGINE 100 MG/1
100 TABLET ORAL DAILY
Qty: 30 TAB | Refills: 1 | Status: SHIPPED | OUTPATIENT
Start: 2018-07-10 | End: 2021-12-03

## 2018-08-03 ENCOUNTER — HOSPITAL ENCOUNTER (EMERGENCY)
Facility: MEDICAL CENTER | Age: 38
End: 2018-08-03
Attending: EMERGENCY MEDICINE
Payer: MEDICAID

## 2018-08-03 VITALS
HEIGHT: 64 IN | OXYGEN SATURATION: 99 % | HEART RATE: 83 BPM | DIASTOLIC BLOOD PRESSURE: 69 MMHG | TEMPERATURE: 97.6 F | RESPIRATION RATE: 16 BRPM | BODY MASS INDEX: 28.98 KG/M2 | SYSTOLIC BLOOD PRESSURE: 118 MMHG | WEIGHT: 169.75 LBS

## 2018-08-03 DIAGNOSIS — R53.83 FATIGUE, UNSPECIFIED TYPE: ICD-10-CM

## 2018-08-03 LAB
ALBUMIN SERPL BCP-MCNC: 4.6 G/DL (ref 3.2–4.9)
ALBUMIN/GLOB SERPL: 1.5 G/DL
ALP SERPL-CCNC: 72 U/L (ref 30–99)
ALT SERPL-CCNC: 11 U/L (ref 2–50)
ANION GAP SERPL CALC-SCNC: 9 MMOL/L (ref 0–11.9)
APPEARANCE UR: CLEAR
AST SERPL-CCNC: 15 U/L (ref 12–45)
BASOPHILS # BLD AUTO: 0.2 % (ref 0–1.8)
BASOPHILS # BLD: 0.02 K/UL (ref 0–0.12)
BILIRUB SERPL-MCNC: 0.7 MG/DL (ref 0.1–1.5)
BILIRUB UR QL STRIP.AUTO: NEGATIVE
BUN SERPL-MCNC: 12 MG/DL (ref 8–22)
CALCIUM SERPL-MCNC: 9.3 MG/DL (ref 8.5–10.5)
CHLORIDE SERPL-SCNC: 104 MMOL/L (ref 96–112)
CO2 SERPL-SCNC: 24 MMOL/L (ref 20–33)
COLOR UR: YELLOW
CREAT SERPL-MCNC: 0.79 MG/DL (ref 0.5–1.4)
EOSINOPHIL # BLD AUTO: 0.05 K/UL (ref 0–0.51)
EOSINOPHIL NFR BLD: 0.6 % (ref 0–6.9)
ERYTHROCYTE [DISTWIDTH] IN BLOOD BY AUTOMATED COUNT: 43.4 FL (ref 35.9–50)
GLOBULIN SER CALC-MCNC: 3 G/DL (ref 1.9–3.5)
GLUCOSE SERPL-MCNC: 92 MG/DL (ref 65–99)
GLUCOSE UR STRIP.AUTO-MCNC: NEGATIVE MG/DL
HCT VFR BLD AUTO: 45.2 % (ref 37–47)
HGB BLD-MCNC: 15.7 G/DL (ref 12–16)
IMM GRANULOCYTES # BLD AUTO: 0.04 K/UL (ref 0–0.11)
IMM GRANULOCYTES NFR BLD AUTO: 0.4 % (ref 0–0.9)
KETONES UR STRIP.AUTO-MCNC: 15 MG/DL
LEUKOCYTE ESTERASE UR QL STRIP.AUTO: NEGATIVE
LYMPHOCYTES # BLD AUTO: 1.36 K/UL (ref 1–4.8)
LYMPHOCYTES NFR BLD: 15 % (ref 22–41)
MCH RBC QN AUTO: 32.4 PG (ref 27–33)
MCHC RBC AUTO-ENTMCNC: 34.7 G/DL (ref 33.6–35)
MCV RBC AUTO: 93.2 FL (ref 81.4–97.8)
MICRO URNS: ABNORMAL
MONOCYTES # BLD AUTO: 0.77 K/UL (ref 0–0.85)
MONOCYTES NFR BLD AUTO: 8.5 % (ref 0–13.4)
NEUTROPHILS # BLD AUTO: 6.85 K/UL (ref 2–7.15)
NEUTROPHILS NFR BLD: 75.3 % (ref 44–72)
NITRITE UR QL STRIP.AUTO: NEGATIVE
NRBC # BLD AUTO: 0 K/UL
NRBC BLD-RTO: 0 /100 WBC
PH UR STRIP.AUTO: 6.5 [PH]
PLATELET # BLD AUTO: 349 K/UL (ref 164–446)
PMV BLD AUTO: 8.9 FL (ref 9–12.9)
POTASSIUM SERPL-SCNC: 3.8 MMOL/L (ref 3.6–5.5)
PROT SERPL-MCNC: 7.6 G/DL (ref 6–8.2)
PROT UR QL STRIP: NEGATIVE MG/DL
RBC # BLD AUTO: 4.85 M/UL (ref 4.2–5.4)
RBC UR QL AUTO: NEGATIVE
SODIUM SERPL-SCNC: 137 MMOL/L (ref 135–145)
SP GR UR STRIP.AUTO: 1.01
UROBILINOGEN UR STRIP.AUTO-MCNC: 0.2 MG/DL
WBC # BLD AUTO: 9.1 K/UL (ref 4.8–10.8)

## 2018-08-03 PROCEDURE — 85025 COMPLETE CBC W/AUTO DIFF WBC: CPT

## 2018-08-03 PROCEDURE — 80053 COMPREHEN METABOLIC PANEL: CPT

## 2018-08-03 PROCEDURE — 96361 HYDRATE IV INFUSION ADD-ON: CPT

## 2018-08-03 PROCEDURE — 700105 HCHG RX REV CODE 258: Performed by: EMERGENCY MEDICINE

## 2018-08-03 PROCEDURE — 81003 URINALYSIS AUTO W/O SCOPE: CPT

## 2018-08-03 PROCEDURE — 99284 EMERGENCY DEPT VISIT MOD MDM: CPT

## 2018-08-03 PROCEDURE — 700111 HCHG RX REV CODE 636 W/ 250 OVERRIDE (IP): Performed by: EMERGENCY MEDICINE

## 2018-08-03 PROCEDURE — 96374 THER/PROPH/DIAG INJ IV PUSH: CPT

## 2018-08-03 RX ORDER — SODIUM CHLORIDE 9 MG/ML
1000 INJECTION, SOLUTION INTRAVENOUS ONCE
Status: COMPLETED | OUTPATIENT
Start: 2018-08-03 | End: 2018-08-03

## 2018-08-03 RX ORDER — ONDANSETRON 2 MG/ML
4 INJECTION INTRAMUSCULAR; INTRAVENOUS ONCE
Status: COMPLETED | OUTPATIENT
Start: 2018-08-03 | End: 2018-08-03

## 2018-08-03 RX ADMIN — SODIUM CHLORIDE 1000 ML: 9 INJECTION, SOLUTION INTRAVENOUS at 14:23

## 2018-08-03 RX ADMIN — ONDANSETRON 4 MG: 2 INJECTION, SOLUTION INTRAMUSCULAR; INTRAVENOUS at 15:25

## 2018-08-03 ASSESSMENT — PAIN SCALES - GENERAL: PAINLEVEL_OUTOF10: 0

## 2018-08-03 NOTE — ED TRIAGE NOTES
Pt amb to triage.  Chief Complaint   Patient presents with   • Nausea     x4d   • Fatigue   • Numbness     fingers, onset today     Pt had hysterectomy in march r/t cancer, pt states she just doesn't feel well and is afraid the cancer has spread.

## 2018-08-03 NOTE — ED PROVIDER NOTES
ED Provider Note    Scribed for Clara Dyer M.D. by Santiago Connor. 8/3/2018, 1:43 PM.    Primary care provider: Gil Patel M.D.  Means of arrival: walk in  History obtained from: patient  History limited by: none    CHIEF COMPLAINT  Chief Complaint   Patient presents with   • Nausea     x4d   • Fatigue   • Numbness     fingers, onset today       HPI  Korina Lyons is a 37 y.o. female who presents to the Emergency Department complaining of sudden onset numbness starting this morning. She localizes her numbness in her distal bilateral hands. Patient reports associated nausea for 4 days, gradually worsening fatigue, decreased appetite, vomiting. She reports a history of hysterectomy 5 months ago secondary to cancer, during which she experienced a similar episode of numbness. Patient states that she was instructed by her oncologist, Dr. Jensen, to come for evaluation if she experiences a recurrent episode of numbness. She states that she has followed up with him 1 month ago and is currently being followed by him. She hasa family history of diabetes. Patient denies diarrhea, bleeding, dysuria, fever, abdominal pain.     REVIEW OF SYSTEMS  Pertinent positives include numbness, nausea, fatigue, decreased appetite, vomiting. Pertinent negatives include no diarrhea, bleeding, dysuria, fever, abdominal pain.  All other systems reviewed and negative.  C.    PAST MEDICAL HISTORY   has a past medical history of Bipolar 1 disorder (HCC); Cancer (HCC) (2017); and Psychiatric problem.    SURGICAL HISTORY   has a past surgical history that includes dilation and curettage (12/17/2017); hysterectomy robotic xi (2/1/2018); salpingectomy (Bilateral, 2/1/2018); oophorectomy (Bilateral, 2/1/2018); and cystoscopy stent placement (Bilateral, 2/1/2018).    SOCIAL HISTORY  Social History   Substance Use Topics   • Smoking status: Current Every Day Smoker     Packs/day: 0.10     Years: 27.00     Types: Cigarettes     Last  "attempt to quit: 1/1/2018   • Smokeless tobacco: Never Used   • Alcohol use Yes      Comment: occ      History   Drug Use   • Types: Inhaled, Marijuana     Comment: Marijuana daily.        FAMILY HISTORY  Family History   Problem Relation Age of Onset   • Cancer Maternal Aunt         ovary   • Cancer Maternal Uncle         ?   • Genetic Maternal Grandfather         lung   • Cancer Maternal Grandfather         lung       CURRENT MEDICATIONS  Current Outpatient Prescriptions:   •  lamoTRIgine (LAMICTAL) 100 MG Tab, Take 1 Tab by mouth every day., Disp: 30 Tab, Rfl: 1  •  naproxen (NAPROSYN) 500 MG Tab, Take 1 Tab by mouth 1 time daily as needed., Disp: 20 Tab, Rfl: 0  •  baclofen (LIORESAL) 10 MG Tab, Take 1 Tab by mouth 1 time daily as needed., Disp: 15 Tab, Rfl: 0  •  hydrOXYzine HCl (ATARAX) 25 MG Tab, Take 1 Tab by mouth 3 times a day as needed for Anxiety., Disp: 30 Tab, Rfl: 1    ALLERGIES  Allergies   Allergen Reactions   • Seroquel [Quetiapine Fumarate]      Restless leg         PHYSICAL EXAM  VITAL SIGNS: /73   Pulse 90   Temp 36.4 °C (97.6 °F)   Resp 18   Ht 1.626 m (5' 4\")   Wt 77 kg (169 lb 12.1 oz)   LMP 01/20/2018   SpO2 99%   BMI 29.14 kg/m²   Constitutional: Alert in no apparent distress.  HENT: No signs of trauma, Bilateral external ears normal, Nose normal. Dry mucous membranes  Eyes: Pupils are equal and reactive, Conjunctiva normal, Non-icteric.   Neck: Normal range of motion, No tenderness, Supple, No stridor.   Cardiovascular: Regular rate and rhythm, no murmurs.   Thorax & Lungs: Normal breath sounds, No respiratory distress, No wheezing, No chest tenderness.   Abdomen: Bowel sounds normal, Soft, No tenderness, No masses, No peritoneal signs.  Skin: Warm, Dry, No erythema, No rash.   Musculoskeletal:  No major deformities noted.   Neurologic: Alert, moving all extremities without difficulty, no focal deficits.    LABS  Labs Reviewed   CBC WITH DIFFERENTIAL - Abnormal; Notable for " the following:        Result Value    MPV 8.9 (*)     Neutrophils-Polys 75.30 (*)     Lymphocytes 15.00 (*)     All other components within normal limits   URINALYSIS,CULTURE IF INDICATED - Abnormal; Notable for the following:     Ketones 15 (*)     All other components within normal limits   COMP METABOLIC PANEL   ESTIMATED GFR   All labs reviewed by me.    COURSE & MEDICAL DECISION MAKING  Pertinent Labs & Imaging studies reviewed. (See chart for details)    Differential diagnoses include but are not limited to: anemia, electrolyte abnormality, UTI, dehydration    1:43 PM - Patient seen and examined at bedside. She is currently being followed by Dr. Jensen for her history of cancer. I encouraged her to continue these consults to monitor for any recurrence. Discussed her evaluation in the ED to rule out emergent processes. Patient verbalizes understanding and agreement to this plan of care. Patient will be treated with NS 1000 ml for dehydration, Zofran 4 mg. Ordered CBC with differential, CMP, Urinalysis culture to evaluate her symptoms.     4:25 PM  Reevaluated patient.  She reports feeling improved after IV fluids.  Reviewed lab results with her at this time there is no evidence of anemia, she has a normal white blood cell count without left shift.  Her electrolytes are normal.  Urinalysis is pending at this time however if this is normal I do not see an identifiable reason for her fatigue.  I advised her that she will be discharged if her urine is negative and can follow-up with her primary care doctor regarding her chronic tingling of her fingers and with Dr. Jensen for continued follow-up of her cervical cancer.  She is agreeable with this plan.       The patient will return for new or worsening symptoms and is stable at the time of discharge. Patient was given return precautions. Patient and/or family member verbalizes understanding and will comply.    DISPOSITION:  Patient will be discharged home in stable  condition.    FOLLOW UP:  Gil Patel M.D.  [address]  [address]  [place] [address]  [phone]    Schedule an appointment as soon as possible for a visit      [facility], Emergency Dept  [address]  [place] [id number] [phone]    Return for worsening pain fevers or other concerns.      OUTPATIENT MEDICATIONS:  New Prescriptions    No medications on file     FINAL IMPRESSION  1. Fatigue, unspecified type           This dictation has been created using voice recognition software and/or scribes. The accuracy of the dictation is limited by the abilities of the software and the expertise of the scribes. I expect there may be some errors of grammar and possibly content. I made every attempt to manually correct the errors within my dictation. However, errors related to voice recognition software and/or scribes may still exist and should be interpreted within the appropriate context.     ISantiago (Scribe), am scribing for, and in the presence of, Clara Dyer M.D..    Electronically signed by: Santiago Connor (Scribe), 8/3/2018    IClara M.D. personally performed the services described in this documentation, as scribed by Santiago Connor in my presence, and it is both accurate and complete.    The note accurately reflects work and decisions made by me.  Clara Dyer  8/3/2018  4:40 PM

## 2018-08-04 ENCOUNTER — PATIENT OUTREACH (OUTPATIENT)
Dept: HEALTH INFORMATION MANAGEMENT | Facility: OTHER | Age: 38
End: 2018-08-04

## 2018-08-04 NOTE — ED NOTES
Discussed Discharge instructions, F/U instructions, and medication instructions with Pt. Questions answered. Pt escorted out of ED in stable condition.

## 2018-10-30 ENCOUNTER — HOSPITAL ENCOUNTER (OUTPATIENT)
Dept: LAB | Facility: MEDICAL CENTER | Age: 38
End: 2018-10-30
Attending: SPECIALIST
Payer: MEDICAID

## 2018-11-01 LAB
FORWARD REASON: SPWHY: NORMAL
FORWARDED TO LAB: SPWHR: NORMAL
SPECIMEN SENT: SPWT1: NORMAL

## 2019-01-06 ENCOUNTER — OFFICE VISIT (OUTPATIENT)
Dept: URGENT CARE | Facility: CLINIC | Age: 39
End: 2019-01-06
Payer: MEDICAID

## 2019-01-06 VITALS
SYSTOLIC BLOOD PRESSURE: 120 MMHG | TEMPERATURE: 97.6 F | HEART RATE: 83 BPM | RESPIRATION RATE: 16 BRPM | DIASTOLIC BLOOD PRESSURE: 74 MMHG | OXYGEN SATURATION: 100 %

## 2019-01-06 DIAGNOSIS — J06.9 VIRAL URI WITH COUGH: ICD-10-CM

## 2019-01-06 PROCEDURE — 99204 OFFICE O/P NEW MOD 45 MIN: CPT | Performed by: NURSE PRACTITIONER

## 2019-01-06 RX ORDER — CODEINE PHOSPHATE AND GUAIFENESIN 10; 100 MG/5ML; MG/5ML
5-10 SOLUTION ORAL EVERY 4 HOURS PRN
Qty: 250 ML | Refills: 0 | Status: SHIPPED | OUTPATIENT
Start: 2019-01-06 | End: 2019-01-13

## 2019-01-06 RX ORDER — BENZONATATE 100 MG/1
100 CAPSULE ORAL 3 TIMES DAILY PRN
Qty: 60 CAP | Refills: 0 | Status: SHIPPED | OUTPATIENT
Start: 2019-01-06 | End: 2021-12-03

## 2019-01-06 ASSESSMENT — ENCOUNTER SYMPTOMS
WHEEZING: 0
RHINORRHEA: 1
FEVER: 0
COUGH: 1
SORE THROAT: 1

## 2019-01-06 ASSESSMENT — COPD QUESTIONNAIRES: COPD: 0

## 2019-01-06 NOTE — PROGRESS NOTES
Subjective:      Korina Lyons is a 38 y.o. female who presents with Nasal Congestion (x4 days, can feel it in her chest); Cough (x4 days, productive cough, yellow mucus); and Pharyngitis (x4 days)            Cough   This is a new problem. Episode onset: pt reports 4 days of cough, ST, sinus congestion and yellow sinus drainage. She denies any known fever. States this cold has been going through everyone in her house. The problem has been unchanged. The cough is productive of sputum. Associated symptoms include nasal congestion, rhinorrhea and a sore throat. Pertinent negatives include no fever or wheezing. She has tried rest and OTC cough suppressant for the symptoms. The treatment provided no relief. There is no history of asthma or COPD. history of smoking       Review of Systems   Constitutional: Negative for fever.   HENT: Positive for rhinorrhea and sore throat.    Respiratory: Negative for wheezing.    All other systems reviewed and are negative.    Past Medical History:   Diagnosis Date   • Bipolar 1 disorder (HCC)    • Cancer (HCC) 2017    Cervical.   • Psychiatric problem     bipolar      Past Surgical History:   Procedure Laterality Date   • HYSTERECTOMY ROBOTIC XI  2/1/2018    Procedure: HYSTERECTOMY ROBOTIC XI- RADICAL;  Surgeon: Tito Jensen M.D.;  Location: [facility];  Service: Gynecology   • SALPINGECTOMY Bilateral 2/1/2018    Procedure: SALPINGECTOMY;  Surgeon: Tito Jensen M.D.;  Location: [facility];  Service: Gynecology   • OOPHORECTOMY Bilateral 2/1/2018    Procedure: OOPHOROPEXY;  Surgeon: Tito Jensen M.D.;  Location: [facility];  Service: Gynecology   • CYSTOSCOPY STENT PLACEMENT Bilateral 2/1/2018    Procedure: CYSTOSCOPY STENT PLACEMENT- URETERAL;  Surgeon: Tito Jensen M.D.;  Location: [facility];  Service: Gynecology   • DILATION AND CURETTAGE  12/17/2017    Procedure: BIOPSY OF CERVICAL MASS;  Surgeon: Denis Keating M.D.;   Location: [facility];  Service: Gynecology      Social History     Social History   • Marital status: Single     Spouse name: N/A   • Number of children: N/A   • Years of education: N/A     Occupational History   • Not on file.     Social History Main Topics   • Smoking status: Current Every Day Smoker     Packs/day: 0.10     Years: 27.00     Types: Cigarettes     Last attempt to quit: 1/1/2018   • Smokeless tobacco: Never Used   • Alcohol use Yes      Comment: occ   • Drug use: Yes     Types: Inhaled, Marijuana      Comment: Marijuana daily.    • Sexual activity: Yes     Partners: Male     Other Topics Concern   • Not on file     Social History Narrative   • No narrative on file          Objective:     /74   Pulse 83   Temp 36.4 °C (97.6 °F)   Resp 16   LMP 01/20/2018   SpO2 100%      Physical Exam   Constitutional: She is oriented to person, place, and time. Vital signs are normal. She appears well-developed and well-nourished.   HENT:   Head: Normocephalic and atraumatic.   Right Ear: Tympanic membrane and external ear normal.   Left Ear: Tympanic membrane and external ear normal.   Nose: Mucosal edema and rhinorrhea present.   Mouth/Throat: Posterior oropharyngeal erythema (mild) present.   Eyes: Pupils are equal, round, and reactive to light. EOM are normal.   Neck: Normal range of motion.   Cardiovascular: Normal rate and regular rhythm.    Pulmonary/Chest: Effort normal and breath sounds normal.   Musculoskeletal: Normal range of motion.   Lymphadenopathy:        Head (right side): Submandibular adenopathy present.        Head (left side): Submandibular adenopathy present.   Neurological: She is alert and oriented to person, place, and time.   Skin: Skin is warm and dry. Capillary refill takes less than 2 seconds.   Psychiatric: She has a normal mood and affect. Her speech is normal and behavior is normal. Thought content normal.   Vitals reviewed.              Assessment/Plan:     1.  Viral URI with cough  - benzonatate (TESSALON) 100 MG Cap; Take 1 Cap by mouth 3 times a day as needed for Cough.  Dispense: 60 Cap; Refill: 0  - guaifenesin-codeine (ROBITUSSIN AC) Solution oral solution; Take 5-10 mL by mouth every four hours as needed for up to 7 days.  Dispense: 250 mL; Refill: 0    Discussed with patient symptoms are viral in nature, there is low concern for any respiratory infection currently. Antibiotics are not advised at this time.  Sedating effects of cough syrup discussed. Checked patient's  and find no evidence of narcotic misuse.  Discussed with patient supportive care  Get plenty of rest  Increase water intake  RTC in one week if no improvement in symptoms  Supportive care, differential diagnoses, and indications for immediate follow-up discussed with patient.    Pathogenesis of diagnosis discussed including typical length and natural progression.      Instructed to return to  or nearest emergency department if symptoms fail to improve, for any change in condition, further concerns, or new concerning symptoms.  Patient states understanding of the plan of care and discharge instructions.

## 2019-01-28 ENCOUNTER — HOSPITAL ENCOUNTER (OUTPATIENT)
Dept: LAB | Facility: MEDICAL CENTER | Age: 39
End: 2019-01-28
Attending: SPECIALIST
Payer: MEDICAID

## 2019-01-30 LAB
FORWARD REASON: SPWHY: NORMAL
FORWARDED TO LAB: SPWHR: NORMAL
SPECIMEN SENT: SPWT1: NORMAL

## 2019-02-07 ENCOUNTER — EH NON-PROVIDER (OUTPATIENT)
Dept: OCCUPATIONAL MEDICINE | Facility: CLINIC | Age: 39
End: 2019-02-07

## 2019-02-07 ENCOUNTER — EMPLOYEE HEALTH (OUTPATIENT)
Dept: OCCUPATIONAL MEDICINE | Facility: CLINIC | Age: 39
End: 2019-02-07

## 2019-02-07 ENCOUNTER — HOSPITAL ENCOUNTER (OUTPATIENT)
Facility: MEDICAL CENTER | Age: 39
End: 2019-02-07
Attending: PREVENTIVE MEDICINE
Payer: COMMERCIAL

## 2019-02-07 VITALS
HEIGHT: 64 IN | RESPIRATION RATE: 16 BRPM | TEMPERATURE: 97.9 F | HEART RATE: 109 BPM | DIASTOLIC BLOOD PRESSURE: 70 MMHG | BODY MASS INDEX: 30.22 KG/M2 | SYSTOLIC BLOOD PRESSURE: 122 MMHG | OXYGEN SATURATION: 98 % | WEIGHT: 177 LBS

## 2019-02-07 DIAGNOSIS — Z02.1 ENCOUNTER FOR PRE-EMPLOYMENT HEALTH SCREENING EXAMINATION: ICD-10-CM

## 2019-02-07 DIAGNOSIS — Z02.1 PRE-EMPLOYMENT DRUG SCREENING: ICD-10-CM

## 2019-02-07 LAB
AMP AMPHETAMINE: NORMAL
BAR BARBITURATES: NORMAL
BZO BENZODIAZEPINES: NORMAL
COC COCAINE: NORMAL
INT CON NEG: NORMAL
INT CON POS: NORMAL
MDMA ECSTASY: NORMAL
MET METHAMPHETAMINES: NORMAL
MTD METHADONE: NORMAL
OPI OPIATES: NORMAL
OXY OXYCODONE: NORMAL
PCP PHENCYCLIDINE: NORMAL
POC URINE DRUG SCREEN OCDRS: NORMAL
THC: NORMAL

## 2019-02-07 PROCEDURE — 86480 TB TEST CELL IMMUN MEASURE: CPT | Performed by: PREVENTIVE MEDICINE

## 2019-02-07 PROCEDURE — 8915 PR COMPREHENSIVE PHYSICAL: Performed by: PREVENTIVE MEDICINE

## 2019-02-07 PROCEDURE — 90715 TDAP VACCINE 7 YRS/> IM: CPT | Performed by: PREVENTIVE MEDICINE

## 2019-02-07 PROCEDURE — 86787 VARICELLA-ZOSTER ANTIBODY: CPT | Performed by: PREVENTIVE MEDICINE

## 2019-02-07 PROCEDURE — 90686 IIV4 VACC NO PRSV 0.5 ML IM: CPT | Performed by: PREVENTIVE MEDICINE

## 2019-02-07 PROCEDURE — 80305 DRUG TEST PRSMV DIR OPT OBS: CPT | Performed by: PREVENTIVE MEDICINE

## 2019-02-07 NOTE — PROGRESS NOTES
Pre-employment physical exam. See scanned documents    Patient's body mass index is 30.38 kg/m². Exercise and nutrition counseling were performed at this visit.

## 2019-02-08 LAB
GAMMA INTERFERON BACKGROUND BLD IA-ACNC: 0.02 IU/ML
M TB IFN-G BLD-IMP: NEGATIVE
M TB IFN-G CD4+ BCKGRND COR BLD-ACNC: 0 IU/ML
MITOGEN IGNF BCKGRD COR BLD-ACNC: 6.55 IU/ML
QFT TB2 - NIL TBQ2: -0.01 IU/ML
VZV IGG SER IA-ACNC: 1.23

## 2019-02-11 ENCOUNTER — EH NON-PROVIDER (OUTPATIENT)
Dept: OCCUPATIONAL MEDICINE | Facility: CLINIC | Age: 39
End: 2019-02-11

## 2019-02-11 DIAGNOSIS — Z71.85 IMMUNIZATION COUNSELING: ICD-10-CM

## 2019-04-17 ENCOUNTER — OFFICE VISIT (OUTPATIENT)
Dept: URGENT CARE | Facility: CLINIC | Age: 39
End: 2019-04-17
Payer: COMMERCIAL

## 2019-04-17 VITALS
OXYGEN SATURATION: 98 % | DIASTOLIC BLOOD PRESSURE: 88 MMHG | RESPIRATION RATE: 16 BRPM | SYSTOLIC BLOOD PRESSURE: 126 MMHG | HEART RATE: 76 BPM | BODY MASS INDEX: 31.28 KG/M2 | WEIGHT: 183.2 LBS | HEIGHT: 64 IN | TEMPERATURE: 97.1 F

## 2019-04-17 DIAGNOSIS — K52.9 GASTROENTERITIS: ICD-10-CM

## 2019-04-17 PROCEDURE — 99214 OFFICE O/P EST MOD 30 MIN: CPT | Performed by: PHYSICIAN ASSISTANT

## 2019-04-17 ASSESSMENT — ENCOUNTER SYMPTOMS
DIAPHORESIS: 0
HEARTBURN: 0
BLOOD IN STOOL: 0
WEAKNESS: 0
BLOATING: 1
PALPITATIONS: 0
HEADACHES: 0
DIZZINESS: 0
WEIGHT LOSS: 0
NAUSEA: 0
FEVER: 0
COUGH: 0
CONSTIPATION: 0
ABDOMINAL PAIN: 1
CHILLS: 0
SHORTNESS OF BREATH: 0
DIARRHEA: 1
VOMITING: 1

## 2019-04-17 NOTE — PROGRESS NOTES
Subjective:      Korina Lyons is a 38 y.o. female who presents with Diarrhea (upset stomach, vomiting, last week her daughter had the stomach flu so she thinks it was passed down to her, *needs a doctors note to go back to work x 3 days )            Diarrhea    This is a new problem. The current episode started in the past 7 days. The problem occurs 2 to 4 times per day. The problem has been unchanged. The stool consistency is described as watery. Associated symptoms include abdominal pain, bloating and vomiting. Pertinent negatives include no chills, coughing, fever, headaches or weight loss. Risk factors include ill contacts. She has tried bismuth subsalicylate for the symptoms. The treatment provided moderate relief.       Review of Systems   Constitutional: Positive for malaise/fatigue. Negative for chills, diaphoresis, fever and weight loss.   Respiratory: Negative for cough and shortness of breath.    Cardiovascular: Negative for chest pain and palpitations.   Gastrointestinal: Positive for abdominal pain, bloating, diarrhea and vomiting. Negative for blood in stool, constipation, heartburn, melena and nausea.   Skin: Negative for itching and rash.   Neurological: Negative for dizziness, weakness and headaches.   All other systems reviewed and are negative.    PMH:  has a past medical history of Bipolar 1 disorder (HCC); Cancer (HCC) (2017); and Psychiatric problem. She also has no past medical history of CAD (coronary artery disease); COPD; Liver disease; or Seizure disorder (HCC).  MEDS:   Current Outpatient Prescriptions:   •  benzonatate (TESSALON) 100 MG Cap, Take 1 Cap by mouth 3 times a day as needed for Cough. (Patient not taking: Reported on 4/17/2019), Disp: 60 Cap, Rfl: 0  •  lamoTRIgine (LAMICTAL) 100 MG Tab, Take 1 Tab by mouth every day. (Patient not taking: Reported on 4/17/2019), Disp: 30 Tab, Rfl: 1  •  naproxen (NAPROSYN) 500 MG Tab, Take 1 Tab by mouth 1 time daily as needed.  "(Patient not taking: Reported on 4/17/2019), Disp: 20 Tab, Rfl: 0  •  baclofen (LIORESAL) 10 MG Tab, Take 1 Tab by mouth 1 time daily as needed. (Patient not taking: Reported on 4/17/2019), Disp: 15 Tab, Rfl: 0  •  hydrOXYzine HCl (ATARAX) 25 MG Tab, Take 1 Tab by mouth 3 times a day as needed for Anxiety. (Patient not taking: Reported on 4/17/2019), Disp: 30 Tab, Rfl: 1  ALLERGIES:   Allergies   Allergen Reactions   • Seroquel [Quetiapine Fumarate]      Restless leg       SURGHX:   Past Surgical History:   Procedure Laterality Date   • HYSTERECTOMY ROBOTIC XI  2/1/2018    Procedure: HYSTERECTOMY ROBOTIC XI- RADICAL;  Surgeon: Tito Jensen M.D.;  Location: [facility];  Service: Gynecology   • SALPINGECTOMY Bilateral 2/1/2018    Procedure: SALPINGECTOMY;  Surgeon: Tito Jensen M.D.;  Location: [facility];  Service: Gynecology   • OOPHORECTOMY Bilateral 2/1/2018    Procedure: OOPHOROPEXY;  Surgeon: Tito Jensen M.D.;  Location: [facility];  Service: Gynecology   • CYSTOSCOPY STENT PLACEMENT Bilateral 2/1/2018    Procedure: CYSTOSCOPY STENT PLACEMENT- URETERAL;  Surgeon: Tito Jensen M.D.;  Location: [facility];  Service: Gynecology   • DILATION AND CURETTAGE  12/17/2017    Procedure: BIOPSY OF CERVICAL MASS;  Surgeon: Denis Keating M.D.;  Location: [facility];  Service: Gynecology     SOCHX:  reports that she has been smoking Cigarettes.  She has a 2.70 pack-year smoking history. She has never used smokeless tobacco. She reports that she drinks alcohol. She reports that she uses drugs, including Inhaled and Marijuana.  FH: Family history was reviewed, no pertinent findings to report  Medications, Allergies, and current problem list reviewed today in Epic     Objective:     /88   Pulse 76   Temp 36.2 °C (97.1 °F) (Temporal)   Resp 16   Ht 1.626 m (5' 4\")   Wt 83.1 kg (183 lb 3.2 oz)   LMP 01/20/2018   SpO2 98%   BMI 31.45 kg/m²  "     Physical Exam   Constitutional: She is oriented to person, place, and time. She appears well-developed and well-nourished. She is active.  Non-toxic appearance. She does not have a sickly appearance. She does not appear ill. No distress.   HENT:   Head: Normocephalic and atraumatic.   Right Ear: External ear normal.   Left Ear: External ear normal.   Nose: Nose normal.   Mouth/Throat: Oropharynx is clear and moist.   Eyes: Conjunctivae, EOM and lids are normal.   Neck: Normal range of motion and full passive range of motion without pain. Neck supple.   Cardiovascular: Normal rate, regular rhythm, S1 normal, S2 normal and normal heart sounds.  Exam reveals no gallop and no friction rub.    No murmur heard.  Pulmonary/Chest: Effort normal and breath sounds normal. No respiratory distress. She has no decreased breath sounds. She has no wheezes. She has no rales. She exhibits no tenderness.   Abdominal: Soft. Normal appearance and bowel sounds are normal. She exhibits no shifting dullness, no distension, no pulsatile liver, no fluid wave, no abdominal bruit, no ascites, no pulsatile midline mass and no mass. There is no tenderness. There is no rigidity, no rebound, no guarding, no CVA tenderness, no tenderness at McBurney's point and negative Velazquez's sign. No hernia.   Abdomen:NO pain to palpation .  Soft and nondistended. Normal bowel sounds. No hepatosplenomegaly or masses, or hernias. No rebound or guarding.     Musculoskeletal: Normal range of motion. She exhibits no edema, tenderness or deformity.   Neurological: She is alert and oriented to person, place, and time.   Skin: Skin is warm, dry and intact. She is not diaphoretic.   Psychiatric: She has a normal mood and affect. Her speech is normal and behavior is normal. Judgment and thought content normal. Cognition and memory are normal.   Vitals reviewed.              Assessment/Plan:     1. Gastroenteritis  - po fluids  - brat diet  - work  note    Differential diagnosis, natural history, supportive care discussed. Follow-up with primary care provider within 7-10 days, emergency room precautions discussed.  Patient and/or family appears understanding of information.  Handout and review of patients diagnosis and treatment was discussed extensively.

## 2019-04-17 NOTE — LETTER
April 17, 2019         Patient: Korina Lyons   YOB: 1980   Date of Visit: 4/17/2019           To Whom it May Concern:    Korina Lyons was seen in my clinic on 4/17/2019. Please excuse he from work 4/15-4/17/2019.  If you have any questions or concerns, please don't hesitate to call.        Sincerely,           Pro Little P.A.-C.  Electronically Signed

## 2019-04-17 NOTE — PATIENT INSTRUCTIONS
Food Choices to Help Relieve Diarrhea, Adult  When you have diarrhea, the foods you eat and your eating habits are very important. Choosing the right foods and drinks can help relieve diarrhea. Also, because diarrhea can last up to 7 days, you need to replace lost fluids and electrolytes (such as sodium, potassium, and chloride) in order to help prevent dehydration.   WHAT GENERAL GUIDELINES DO I NEED TO FOLLOW?  · Slowly drink 1 cup (8 oz) of fluid for each episode of diarrhea. If you are getting enough fluid, your urine will be clear or pale yellow.  · Eat starchy foods. Some good choices include white rice, white toast, pasta, low-fiber cereal, baked potatoes (without the skin), saltine crackers, and bagels.  · Avoid large servings of any cooked vegetables.  · Limit fruit to two servings per day. A serving is ½ cup or 1 small piece.  · Choose foods with less than 2 g of fiber per serving.  · Limit fats to less than 8 tsp (38 g) per day.  · Avoid fried foods.  · Eat foods that have probiotics in them. Probiotics can be found in certain dairy products.  · Avoid foods and beverages that may increase the speed at which food moves through the stomach and intestines (gastrointestinal tract). Things to avoid include:  ¨ High-fiber foods, such as dried fruit, raw fruits and vegetables, nuts, seeds, and whole grain foods.  ¨ Spicy foods and high-fat foods.  ¨ Foods and beverages sweetened with high-fructose corn syrup, honey, or sugar alcohols such as xylitol, sorbitol, and mannitol.  WHAT FOODS ARE RECOMMENDED?  Grains  White rice. White, Prydeinig, or hugo breads (fresh or toasted), including plain rolls, buns, or bagels. White pasta. Saltine, soda, or kalen crackers. Pretzels. Low-fiber cereal. Cooked cereals made with water (such as cornmeal, farina, or cream cereals). Plain muffins. Matzo. Alejandra toast. Zwieback.   Vegetables  Potatoes (without the skin). Strained tomato and vegetable juices. Most well-cooked and canned  vegetables without seeds. Tender lettuce.  Fruits  Cooked or canned applesauce, apricots, cherries, fruit cocktail, grapefruit, peaches, pears, or plums. Fresh bananas, apples without skin, cherries, grapes, cantaloupe, grapefruit, peaches, oranges, or plums.   Meat and Other Protein Products  Baked or boiled chicken. Eggs. Tofu. Fish. Seafood. Smooth peanut butter. Ground or well-cooked tender beef, ham, veal, lamb, pork, or poultry.   Dairy  Plain yogurt, kefir, and unsweetened liquid yogurt. Lactose-free milk, buttermilk, or soy milk. Plain hard cheese.  Beverages  Sport drinks. Clear broths. Diluted fruit juices (except prune). Regular, caffeine-free sodas such as ginger ale. Water. Decaffeinated teas. Oral rehydration solutions. Sugar-free beverages not sweetened with sugar alcohols.  Other  Bouillon, broth, or soups made from recommended foods.   The items listed above may not be a complete list of recommended foods or beverages. Contact your dietitian for more options.  WHAT FOODS ARE NOT RECOMMENDED?  Grains  Whole grain, whole wheat, bran, or rye breads, rolls, pastas, crackers, and cereals. Wild or brown rice. Cereals that contain more than 2 g of fiber per serving. Corn tortillas or taco shells. Cooked or dry oatmeal. Granola. Popcorn.  Vegetables  Raw vegetables. Cabbage, broccoli, [place]vid sprouts, artichokes, baked beans, beet greens, corn, kale, legumes, peas, sweet potatoes, and yams. Potato skins. Cooked spinach and cabbage.  Fruits  Dried fruit, including raisins and dates. Raw fruits. Stewed or dried prunes. Fresh apples with skin, apricots, mangoes, pears, raspberries, and strawberries.   Meat and Other Protein Products  [place] peanut butter. Nuts and seeds. Beans and lentils. Lange.   Dairy  High-fat cheeses. Milk, chocolate milk, and beverages made with milk, such as milk shakes. Cream. Ice cream.  Sweets and Desserts  Sweet rolls, doughnuts, and sweet breads. Pancakes and waffles.  Fats and  Oils  Butter. Cream sauces. Margarine. Salad oils. Plain salad dressings. Olives. Avocados.   Beverages  Caffeinated beverages (such as coffee, tea, soda, or energy drinks). Alcoholic beverages. Fruit juices with pulp. Prune juice. Soft drinks sweetened with high-fructose corn syrup or sugar alcohols.  Other  Coconut. Hot sauce. Chili powder. Mayonnaise. Gravy. Cream-based or milk-based soups.   The items listed above may not be a complete list of foods and beverages to avoid. Contact your dietitian for more information.  WHAT SHOULD I DO IF I BECOME DEHYDRATED?  Diarrhea can sometimes lead to dehydration. Signs of dehydration include dark urine and dry mouth and skin. If you think you are dehydrated, you should rehydrate with an oral rehydration solution. These solutions can be purchased at pharmacies, retail stores, or online.   Drink ½-1 cup (120-240 mL) of oral rehydration solution each time you have an episode of diarrhea. If drinking this amount makes your diarrhea worse, try drinking smaller amounts more often. For example, drink 1-3 tsp (5-15 mL) every 5-10 minutes.   A general rule for staying hydrated is to drink 1½-2 L of fluid per day. Talk to your health care provider about the specific amount you should be drinking each day. Drink enough fluids to keep your urine clear or pale yellow.     This information is not intended to replace advice given to you by your health care provider. Make sure you discuss any questions you have with your health care provider.     Document Released: 03/09/2005 Document Revised: 01/08/2016 Document Reviewed: 11/10/2014  [employer] Interactive Patient Education ©2016 [employer]

## 2019-05-14 ENCOUNTER — HOSPITAL ENCOUNTER (OUTPATIENT)
Dept: LAB | Facility: MEDICAL CENTER | Age: 39
End: 2019-05-14
Attending: SPECIALIST
Payer: COMMERCIAL

## 2019-05-31 LAB — TEST NAME 95000: NORMAL

## 2019-06-05 ENCOUNTER — OFFICE VISIT (OUTPATIENT)
Dept: URGENT CARE | Facility: PHYSICIAN GROUP | Age: 39
End: 2019-06-05
Payer: COMMERCIAL

## 2019-06-05 VITALS
HEART RATE: 114 BPM | HEIGHT: 64 IN | WEIGHT: 175 LBS | RESPIRATION RATE: 16 BRPM | OXYGEN SATURATION: 98 % | SYSTOLIC BLOOD PRESSURE: 120 MMHG | BODY MASS INDEX: 29.88 KG/M2 | TEMPERATURE: 98.8 F | DIASTOLIC BLOOD PRESSURE: 76 MMHG

## 2019-06-05 DIAGNOSIS — J01.00 ACUTE NON-RECURRENT MAXILLARY SINUSITIS: ICD-10-CM

## 2019-06-05 DIAGNOSIS — J02.9 SORE THROAT: ICD-10-CM

## 2019-06-05 LAB
INT CON NEG: NEGATIVE
INT CON POS: POSITIVE
S PYO AG THROAT QL: NORMAL

## 2019-06-05 PROCEDURE — 99214 OFFICE O/P EST MOD 30 MIN: CPT | Performed by: PHYSICIAN ASSISTANT

## 2019-06-05 PROCEDURE — 87880 STREP A ASSAY W/OPTIC: CPT | Performed by: PHYSICIAN ASSISTANT

## 2019-06-05 RX ORDER — AMOXICILLIN AND CLAVULANATE POTASSIUM 875; 125 MG/1; MG/1
1 TABLET, FILM COATED ORAL 2 TIMES DAILY
Qty: 14 TAB | Refills: 0 | Status: SHIPPED | OUTPATIENT
Start: 2019-06-05 | End: 2019-06-12

## 2019-06-05 ASSESSMENT — ENCOUNTER SYMPTOMS
COUGH: 1
SORE THROAT: 1
CHILLS: 1
CARDIOVASCULAR NEGATIVE: 1
WHEEZING: 0
FEVER: 0
SINUS PAIN: 1
MYALGIAS: 1
GASTROINTESTINAL NEGATIVE: 1
SHORTNESS OF BREATH: 0
RHINORRHEA: 1
DIZZINESS: 0
HEADACHES: 1

## 2019-06-05 NOTE — PROGRESS NOTES
Subjective:      Korina Lyons is a 38 y.o. female who presents with Cough (congestion, sore throat, X 3 days )            Cough   This is a new problem. The current episode started in the past 7 days. The problem has been gradually worsening. The problem occurs constantly. The cough is productive of sputum. Associated symptoms include chills, headaches, myalgias, nasal congestion, postnasal drip, rhinorrhea and a sore throat. Pertinent negatives include no chest pain, ear pain, fever, shortness of breath or wheezing. She has tried OTC cough suppressant for the symptoms. The treatment provided mild relief. There is no history of asthma or pneumonia.         PMH:  has a past medical history of Bipolar 1 disorder (HCC); Cancer ([facility]) (2017); and Psychiatric problem. She also has no past medical history of CAD (coronary artery disease); COPD; Liver disease; or Seizure disorder ([facility]).  MEDS:   Current Outpatient Prescriptions:   •  amoxicillin-clavulanate (AUGMENTIN) 875-125 MG Tab, Take 1 Tab by mouth 2 times a day for 7 days., Disp: 14 Tab, Rfl: 0  •  benzonatate (TESSALON) 100 MG Cap, Take 1 Cap by mouth 3 times a day as needed for Cough. (Patient not taking: Reported on 4/17/2019), Disp: 60 Cap, Rfl: 0  •  lamoTRIgine (LAMICTAL) 100 MG Tab, Take 1 Tab by mouth every day. (Patient not taking: Reported on 4/17/2019), Disp: 30 Tab, Rfl: 1  •  naproxen (NAPROSYN) 500 MG Tab, Take 1 Tab by mouth 1 time daily as needed. (Patient not taking: Reported on 4/17/2019), Disp: 20 Tab, Rfl: 0  •  baclofen (LIORESAL) 10 MG Tab, Take 1 Tab by mouth 1 time daily as needed. (Patient not taking: Reported on 4/17/2019), Disp: 15 Tab, Rfl: 0  •  hydrOXYzine HCl (ATARAX) 25 MG Tab, Take 1 Tab by mouth 3 times a day as needed for Anxiety. (Patient not taking: Reported on 4/17/2019), Disp: 30 Tab, Rfl: 1  ALLERGIES:   Allergies   Allergen Reactions   • Seroquel [Quetiapine Fumarate]      Restless leg       SURGHX:   Past Surgical  History:   Procedure Laterality Date   • HYSTERECTOMY ROBOTIC XI  2/1/2018    Procedure: HYSTERECTOMY ROBOTIC XI- RADICAL;  Surgeon: Tito Jensen M.D.;  Location: SURGERY [facility];  Service: Gynecology   • SALPINGECTOMY Bilateral 2/1/2018    Procedure: SALPINGECTOMY;  Surgeon: Tito Jensen M.D.;  Location: SURGERY [facility];  Service: Gynecology   • OOPHORECTOMY Bilateral 2/1/2018    Procedure: OOPHOROPEXY;  Surgeon: Tito Jensen M.D.;  Location: SURGERY [facility];  Service: Gynecology   • CYSTOSCOPY STENT PLACEMENT Bilateral 2/1/2018    Procedure: CYSTOSCOPY STENT PLACEMENT- URETERAL;  Surgeon: Tito Jensen M.D.;  Location: SURGERY [facility];  Service: Gynecology   • DILATION AND CURETTAGE  12/17/2017    Procedure: BIOPSY OF CERVICAL MASS;  Surgeon: Denis Keating M.D.;  Location: [facility];  Service: Gynecology     SOCHX:  reports that she quit smoking about 17 months ago. Her smoking use included Cigarettes. She has a 2.70 pack-year smoking history. She has never used smokeless tobacco. She reports that she drinks alcohol. She reports that she uses drugs, including Inhaled and Marijuana.  FH: family history includes Cancer in her maternal aunt, maternal grandfather, and maternal uncle; Genetic in her maternal grandfather.    Review of Systems   Constitutional: Positive for chills. Negative for fever.   HENT: Positive for congestion, postnasal drip, rhinorrhea, sinus pain and sore throat. Negative for ear pain.    Respiratory: Positive for cough. Negative for shortness of breath and wheezing.    Cardiovascular: Negative.  Negative for chest pain.   Gastrointestinal: Negative.    Musculoskeletal: Positive for myalgias. Negative for joint pain.   Neurological: Positive for headaches. Negative for dizziness.       Medications, Allergies, and current problem list reviewed today in Epic     Objective:     /76   Pulse (!) 114   Temp 37.1 °C (98.8 °F)   Resp 16   Ht  "1.626 m (5' 4\")   Wt 79.4 kg (175 lb)   LMP 01/20/2018   SpO2 98%   BMI 30.04 kg/m²      Physical Exam   Constitutional: She is oriented to person, place, and time. She appears well-developed and well-nourished. No distress.   HENT:   Head: Normocephalic and atraumatic.   Right Ear: Hearing, tympanic membrane, external ear and ear canal normal. Tympanic membrane is not erythematous. No decreased hearing is noted.   Left Ear: Hearing, tympanic membrane, external ear and ear canal normal. Tympanic membrane is not erythematous. No decreased hearing is noted.   Nose: Right sinus exhibits maxillary sinus tenderness. Left sinus exhibits maxillary sinus tenderness.   Mouth/Throat: Normal dentition. Posterior oropharyngeal erythema present. No oropharyngeal exudate.   Eyes: Pupils are equal, round, and reactive to light. Conjunctivae and EOM are normal. Right eye exhibits no discharge. Left eye exhibits no discharge. No scleral icterus.   Neck: Normal range of motion. Neck supple.   Cardiovascular: Normal rate, regular rhythm and normal heart sounds.    No murmur heard.  Pulmonary/Chest: Effort normal and breath sounds normal. No respiratory distress. She has no wheezes.   Lymphadenopathy:        Head (right side): Submandibular adenopathy present.        Head (left side): Submandibular adenopathy present.     She has no cervical adenopathy.        Right cervical: No superficial cervical adenopathy present.       Left cervical: No superficial cervical adenopathy present.   Neurological: She is alert and oriented to person, place, and time.   Skin: Skin is warm, dry and intact. She is not diaphoretic. No cyanosis. Nails show no clubbing.   Psychiatric: She has a normal mood and affect. Her behavior is normal. Judgment and thought content normal.   Nursing note and vitals reviewed.              Assessment/Plan:     1. Sore throat  POCT Rapid Strep A   2. Acute non-recurrent maxillary sinusitis  amoxicillin-clavulanate " (AUGMENTIN) 875-125 MG Tab     Take full course of antibiotic  Tylenol and Motrin for fever and pain  OTC meds as discussed including oral decongestant if tolerated  Increase fluids and rest  Nasal spray, nasal wash, Angélica pot    Return to clinic or go to ED if symptoms worsen or persist. Indications for ED discussed at length. Patient voices understanding. Follow-up with your primary care provider in 3-5 days. Red flags discussed. All side effects of medication discussed including allergic response, GI upset, tendon injury, etc.    Please note that this dictation was created using voice recognition software. I have made every reasonable attempt to correct obvious errors, but I expect that there are errors of grammar and possibly content that I did not discover before finalizing the note.

## 2019-06-05 NOTE — LETTER
June 5, 2019         Patient: Korina Lyons   YOB: 1980   Date of Visit: 6/5/2019           To Whom it May Concern:    Korina Lyons was seen in my clinic on 6/5/2019. She may return to work on Friday June 7th.    If you have any questions or concerns, please don't hesitate to call.        Sincerely,           Wilfredo Hatch P.A.-C.  Electronically Signed

## 2020-06-04 ENCOUNTER — HOSPITAL ENCOUNTER (EMERGENCY)
Facility: MEDICAL CENTER | Age: 40
End: 2020-06-04
Attending: EMERGENCY MEDICINE
Payer: MEDICAID

## 2020-06-04 ENCOUNTER — APPOINTMENT (OUTPATIENT)
Dept: RADIOLOGY | Facility: MEDICAL CENTER | Age: 40
End: 2020-06-04
Attending: EMERGENCY MEDICINE
Payer: MEDICAID

## 2020-06-04 VITALS
BODY MASS INDEX: 29.24 KG/M2 | OXYGEN SATURATION: 98 % | WEIGHT: 171.3 LBS | DIASTOLIC BLOOD PRESSURE: 71 MMHG | SYSTOLIC BLOOD PRESSURE: 113 MMHG | HEART RATE: 84 BPM | RESPIRATION RATE: 16 BRPM | TEMPERATURE: 97.7 F | HEIGHT: 64 IN

## 2020-06-04 DIAGNOSIS — S09.90XA CLOSED HEAD INJURY, INITIAL ENCOUNTER: ICD-10-CM

## 2020-06-04 DIAGNOSIS — S16.1XXA STRAIN OF NECK MUSCLE, INITIAL ENCOUNTER: ICD-10-CM

## 2020-06-04 PROCEDURE — 99283 EMERGENCY DEPT VISIT LOW MDM: CPT | Mod: 25

## 2020-06-04 PROCEDURE — 72125 CT NECK SPINE W/O DYE: CPT

## 2020-06-04 PROCEDURE — 70450 CT HEAD/BRAIN W/O DYE: CPT

## 2020-06-04 RX ORDER — IBUPROFEN 800 MG/1
800 TABLET ORAL EVERY 8 HOURS PRN
Qty: 30 TAB | Refills: 0 | Status: SHIPPED | OUTPATIENT
Start: 2020-06-04 | End: 2021-12-03

## 2020-06-04 ASSESSMENT — FIBROSIS 4 INDEX: FIB4 SCORE: 0.53
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
## 2020-06-04 NOTE — ED NOTES
Pt ambulatory to PUR 75. Pt changed into gown and placed on monitor.  Agree with triage note.   Chart up and ready for ERP now.

## 2020-09-03 ENCOUNTER — OFFICE VISIT (OUTPATIENT)
Dept: URGENT CARE | Facility: PHYSICIAN GROUP | Age: 40
End: 2020-09-03
Payer: MEDICAID

## 2020-09-03 VITALS
WEIGHT: 179 LBS | BODY MASS INDEX: 30.56 KG/M2 | RESPIRATION RATE: 20 BRPM | DIASTOLIC BLOOD PRESSURE: 76 MMHG | HEART RATE: 104 BPM | TEMPERATURE: 98 F | OXYGEN SATURATION: 97 % | SYSTOLIC BLOOD PRESSURE: 124 MMHG | HEIGHT: 64 IN

## 2020-09-03 DIAGNOSIS — K29.70 GASTRITIS WITHOUT BLEEDING, UNSPECIFIED CHRONICITY, UNSPECIFIED GASTRITIS TYPE: ICD-10-CM

## 2020-09-03 LAB
APPEARANCE UR: CLEAR
BILIRUB UR STRIP-MCNC: NORMAL MG/DL
COLOR UR AUTO: YELLOW
GLUCOSE UR STRIP.AUTO-MCNC: NORMAL MG/DL
KETONES UR STRIP.AUTO-MCNC: NORMAL MG/DL
LEUKOCYTE ESTERASE UR QL STRIP.AUTO: NORMAL
NITRITE UR QL STRIP.AUTO: NORMAL
PH UR STRIP.AUTO: 6 [PH] (ref 5–8)
PROT UR QL STRIP: NORMAL MG/DL
RBC UR QL AUTO: NORMAL
SP GR UR STRIP.AUTO: 1.03
UROBILINOGEN UR STRIP-MCNC: NORMAL MG/DL

## 2020-09-03 PROCEDURE — 81002 URINALYSIS NONAUTO W/O SCOPE: CPT | Performed by: PHYSICIAN ASSISTANT

## 2020-09-03 PROCEDURE — 99214 OFFICE O/P EST MOD 30 MIN: CPT | Mod: 25 | Performed by: PHYSICIAN ASSISTANT

## 2020-09-03 RX ORDER — OMEPRAZOLE 20 MG/1
20 CAPSULE, DELAYED RELEASE ORAL DAILY
Qty: 30 CAP | Refills: 0 | Status: SHIPPED | OUTPATIENT
Start: 2020-09-03 | End: 2021-12-03

## 2020-09-03 NOTE — PROGRESS NOTES
Chief Complaint   Patient presents with   • Cough   • Pharyngitis       HISTORY OF PRESENT ILLNESS: Patient is a 40 y.o. female who presents today because a one-week history of midepigastric stomach discomfort, sore throat.  She tried some Pepto-Bismol but it did not really help very much.  She has also had some diarrhea, no blood in her stool.  She has had nausea without vomiting.  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 30 Cap 0   • ibuprofen (MOTRIN) 800 MG Tab Take 1 Tab by mouth every 8 hours as needed. (Patient not taking: Reported on 9/3/2020) 30 Tab 0   • benzonatate (TESSALON) 100 MG Cap Take 1 Cap by mouth 3 times a day as needed for Cough. (Patient not taking: Reported on 4/17/2019) 60 Cap 0   • lamoTRIgine (LAMICTAL) 100 MG Tab Take 1 Tab by mouth every day. (Patient not taking: Reported on 4/17/2019) 30 Tab 1   • naproxen (NAPROSYN) 500 MG Tab Take 1 Tab by mouth 1 time daily as needed. (Patient not taking: Reported on 4/17/2019) 20 Tab 0   • baclofen (LIORESAL) 10 MG Tab Take 1 Tab by mouth 1 time daily as needed. (Patient not taking: Reported on 4/17/2019) 15 Tab 0   • hydrOXYzine HCl (ATARAX) 25 MG Tab Take 1 Tab by mouth 3 times a day as needed for Anxiety. (Patient not taking: Reported on 4/17/2019) 30 Tab 1     No current Epic-ordered facility-administered medications on file.        Past Medical History:   Diagnosis Date   • Bipolar 1 disorder (HCC)    • Cancer (HCC) 2017    Cervical.   • Psychiatric problem     bipolar       Social History     Tobacco Use   • Smoking status: Former Smoker      "Packs/day: 0.10     Years: 27.00     Pack years: 2.70     Types: Cigarettes     Quit date: 2018     Years since quittin.6   • Smokeless tobacco: Never Used   Substance Use Topics   • Alcohol use: Yes     Comment: occ   • Drug use: Yes     Types: Inhaled, Marijuana     Comment: Marijuana daily.        Family Status   Relation Name Status   • Mo  Alive   • Fa  Other   • MAunt  (Not Specified)   • MUnc  (Not Specified)   • MGFa  (Not Specified)     Family History   Problem Relation Age of Onset   • Cancer Maternal Aunt         ovary   • Cancer Maternal Uncle         ?   • Genetic Disorder Maternal Grandfather         lung   • Cancer Maternal Grandfather         lung       ROS:  Review of Systems   Constitutional: Negative for fever, chills, weight loss and malaise/fatigue.   HENT: Negative for ear pain, nosebleeds, congestion, positive for sore throat and no neck pain.    Eyes: Negative for blurred vision.   Respiratory: Negative for cough, sputum production, shortness of breath and wheezing.    Cardiovascular: Negative for chest pain, palpitations, orthopnea and leg swelling.   Gastrointestinal: Negative for heartburn, positive for nausea, no vomiting and positive for abdominal pain.   Genitourinary: Negative for dysuria, urgency and frequency.     Exam:  /76 (BP Location: Right arm, Patient Position: Sitting, BP Cuff Size: Adult)   Pulse (!) 104   Temp 36.7 °C (98 °F) (Temporal)   Resp 20   Ht 1.626 m (5' 4\")   Wt 81.2 kg (179 lb)   SpO2 97%   General:  Well nourished, well developed female in NAD  Head:Normocephalic, atraumatic  Eyes: PERRLA, EOM within normal limits, no conjunctival injection, no scleral icterus, visual fields and acuity grossly intact.  Nose: Symmetrical without tenderness, no discharge.  Mouth: reasonable hygiene, no erythema exudates or tonsillar enlargement.  Neck: no masses, range of motion within normal limits, no tracheal deviation. No obvious thyroid " enlargement.  Abdomen: Nondistended, bowel sounds in all 4 quadrants, soft, she has midepigastric tenderness to palpation, no other significant tenderness to palpation, no organomegaly, no rebound for rebound tenderness, no Velazquez's or McBurney's point tenderness.    Extremities: no clubbing, cyanosis, or edema.    UA unremarkable    Please note that this dictation was created using voice recognition software. I have made every reasonable attempt to correct obvious errors, but I expect that there are errors of grammar and possibly content that I did not discover before finalizing the note.    Assessment/Plan:  1. Gastritis without bleeding, unspecified chronicity, unspecified gastritis type  POCT Urinalysis    omeprazole (PRILOSEC) 20 MG delayed-release capsule   Patient was tested for COVID 2 days ago, results pending    Followup with primary care in the next 7-10 days if not significantly improving, return to the urgent care or go to the emergency room sooner for any worsening of symptoms.

## 2020-09-27 NOTE — OR SURGEON
Immediate Post OP Note    PreOp Diagnosis:   1.)  Abnormal Pap smear (atypical squamous of undetermined significance and the report went on to say that the test for high-risk HPV was positive for high-risk HPV) in June of this year.  2.)  Fairly large (5 centimeters in diameter) cervical mass in and arising from the 6 o’clock position of the cervix; her abnormal Pap smear and the finding of this mass in her symptoms of mucus vaginal discharge for several months of postcoital bleeding raises the suspicion of cervical carcinoma.    PostOp Diagnosis:   1.)  Abnormal Pap smear (atypical squamous of undetermined significance and the report went on to say that the test for high-risk HPV was positive for high-risk HPV) in June of this year.  2.)  Fairly large (5 centimeters in diameter) cervical mass in and arising from the 6 o’clock position of the cervix; her abnormal Pap smear and the finding of this mass in her symptoms of mucus vaginal discharge for several months of postcoital bleeding raises the suspicion of cervical carcinoma. Pathology pending.    Procedure(s):  BIOPSY OF CERVICAL MASS - Wound Class: Clean Contaminated    Surgeon(s):  Denis Keating M.D.    Anesthesiologist/Type of Anesthesia:  Anesthesiologist: Fernando Rebolledo III, M.D./General    Surgical Staff:  Circulator: Elpidio Zuleta R.N.  Scrub Person: Celso Ryan    Specimens:  Biopsy of cervical mass    Estimated Blood Loss:  Less than 30 mL    Findings:   Both speculum exam and bimanual exam under anesthesia reveal a cervical mass located in an coming from the 6:00 position of the cervix.    Complications:  None        12/17/2017 2:48 PM Denis Keating     no

## 2021-04-04 ENCOUNTER — OFFICE VISIT (OUTPATIENT)
Dept: URGENT CARE | Facility: CLINIC | Age: 41
End: 2021-04-04
Payer: MEDICAID

## 2021-04-04 ENCOUNTER — HOSPITAL ENCOUNTER (OUTPATIENT)
Facility: MEDICAL CENTER | Age: 41
End: 2021-04-04
Attending: STUDENT IN AN ORGANIZED HEALTH CARE EDUCATION/TRAINING PROGRAM
Payer: MEDICAID

## 2021-04-04 VITALS
HEIGHT: 64 IN | WEIGHT: 203.8 LBS | DIASTOLIC BLOOD PRESSURE: 70 MMHG | OXYGEN SATURATION: 99 % | SYSTOLIC BLOOD PRESSURE: 110 MMHG | BODY MASS INDEX: 34.79 KG/M2 | TEMPERATURE: 98.5 F | RESPIRATION RATE: 16 BRPM | HEART RATE: 82 BPM

## 2021-04-04 DIAGNOSIS — H10.9 BACTERIAL CONJUNCTIVITIS OF LEFT EYE: ICD-10-CM

## 2021-04-04 DIAGNOSIS — J02.9 SORE THROAT: ICD-10-CM

## 2021-04-04 DIAGNOSIS — N76.0 VAGINOSIS: ICD-10-CM

## 2021-04-04 DIAGNOSIS — Z20.2 POSSIBLE EXPOSURE TO STD: ICD-10-CM

## 2021-04-04 LAB
CANDIDA DNA VAG QL PROBE+SIG AMP: NEGATIVE
G VAGINALIS DNA VAG QL PROBE+SIG AMP: NEGATIVE
T VAGINALIS DNA VAG QL PROBE+SIG AMP: POSITIVE

## 2021-04-04 PROCEDURE — 87660 TRICHOMONAS VAGIN DIR PROBE: CPT

## 2021-04-04 PROCEDURE — 87480 CANDIDA DNA DIR PROBE: CPT

## 2021-04-04 PROCEDURE — 99214 OFFICE O/P EST MOD 30 MIN: CPT | Performed by: STUDENT IN AN ORGANIZED HEALTH CARE EDUCATION/TRAINING PROGRAM

## 2021-04-04 PROCEDURE — 87510 GARDNER VAG DNA DIR PROBE: CPT

## 2021-04-04 PROCEDURE — 87491 CHLMYD TRACH DNA AMP PROBE: CPT

## 2021-04-04 PROCEDURE — 87591 N.GONORRHOEAE DNA AMP PROB: CPT

## 2021-04-04 RX ORDER — POLYMYXIN B SULFATE AND TRIMETHOPRIM 1; 10000 MG/ML; [USP'U]/ML
SOLUTION OPHTHALMIC
Qty: 10 ML | Refills: 0 | Status: SHIPPED | OUTPATIENT
Start: 2021-04-04 | End: 2021-12-03

## 2021-04-04 RX ORDER — POLYMYXIN B SULFATE AND TRIMETHOPRIM 1; 10000 MG/ML; [USP'U]/ML
SOLUTION OPHTHALMIC
Qty: 10 ML | Refills: 0 | Status: SHIPPED | OUTPATIENT
Start: 2021-04-04 | End: 2021-04-04

## 2021-04-04 NOTE — PROGRESS NOTES
Subjective:   CHIEF COMPLAINT  Chief Complaint   Patient presents with   • Conjunctivitis     woke up with red eye (L) and crusty and itchy.   • Pharyngitis     minor sore throat, Recvd 1st Covid vaccine 4/1/21       [facility]  Korina Lyons is a 40 y.o. female who presents with multiple complaints.    [place]: Patient presents with chief complaint of redness, discharge and itchy sensation of her left eye.  Symptoms started this morning.  Patient says she woke up with her eye crusted.  She does not wear contacts.  No concerns of foreign body.    Patient presents with a chief complaint of sore throat.  Symptoms started this morning.  Symptoms are aggravated with swallowing.  She is not experience any cough or nasal congestion.  She does have a history of allergies    Patient is also presents requesting to be tested for STIs.  Says she has a new partner and has been experiencing vaginal discharge and itchy sensation.  She tried Monistat which is provided nominal relief of symptoms.  She is not having dysuria or hematuria.    REVIEW OF SYSTEMS  General: no fever or chills  GI: no nausea or vomiting  See HPI for further details.    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    ALLERGIES  Allergies   Allergen Reactions   • Seroquel [Quetiapine Fumarate]      Restless leg         CURRENT MEDICATIONS  Home Medications     Reviewed by Vincent Conrad D.O. (Physician) on 04/04/21 at 1210  Med List Status: <None>   Medication Last Dose Status   baclofen (LIORESAL) 10 MG Tab Not Taking  "Active   benzonatate (TESSALON) 100 MG Cap Not Taking Active   hydrOXYzine HCl (ATARAX) 25 MG Tab Not Taking Active   ibuprofen (MOTRIN) 800 MG Tab Not Taking Active   lamoTRIgine (LAMICTAL) 100 MG Tab Not Taking Active   naproxen (NAPROSYN) 500 MG Tab Not Taking Active   omeprazole (PRILOSEC) 20 MG delayed-release capsule Not Taking Active                SOCIAL HISTORY  Social History     Tobacco Use   • Smoking status: Former Smoker     Packs/day: 0.10     Years: 27.00     Pack years: 2.70     Types: Cigarettes     Quit date: 1/1/2018     Years since quitting: 3.2   • Smokeless tobacco: Never Used   Substance and Sexual Activity   • Alcohol use: Yes     Comment: occ   • Drug use: Yes     Types: Inhaled, Marijuana     Comment: Marijuana daily.    • Sexual activity: Yes     Partners: Male       FAMILY HISTORY  Family History   Problem Relation Age of Onset   • Cancer Maternal Aunt         ovary   • Cancer Maternal Uncle         ?   • Genetic Disorder Maternal Grandfather         lung   • Cancer Maternal Grandfather         lung          Objective:   PHYSICAL EXAM  VITAL SIGNS: /70 (BP Location: Left arm, Patient Position: Sitting, BP Cuff Size: Adult)   Pulse 82   Temp 36.9 °C (98.5 °F) (Temporal)   Resp 16   Ht 1.626 m (5' 4\")   Wt 92.4 kg (203 lb 12.8 oz)   LMP 01/20/2018   SpO2 99%   BMI 34.98 kg/m²     Gen: no acute distress, normal voice  Skin: dry, intact, moist mucosal membranes  Eye: Left eye with conjunctival injection and discharge.  ENT: No pharyngeal erythema or exudates.  Uvula midline.  Lungs: CTAB w/ symmetric expansion  CV: RRR w/o murmurs or clicks  Psych: normal affect, normal judgement, alert, awake    Assessment/Plan:     1. Bacterial conjunctivitis of left eye  polymixin-trimethoprim (POLYTRIM) 58097-7.1 UNIT/ML-% Solution    DISCONTINUED: polymixin-trimethoprim (POLYTRIM) 18993-8.1 UNIT/ML-% Solution   2. Sore throat     3. Vaginosis  VAGINAL PATHOGENS DNA PANEL   4. Possible " exposure to STD  Chlamydia/GC PCR Urine Or Swab   1) signs and symptoms consistent with a bacterial conjunctivitis.  Patient does not wear contacts  -Rx sent for Polytrim eyedrops  -Provided note for work; okay to return once symptoms have resolved    2) suspect secondary to allergies  -Instructed to take a daily antihistamine (e.g. Claritin, Zyrtec, Allegra)  -Instructed to use daily intranasal steroid    3-4) patient with heavy vaginal discharge and a new partner.  No dysuria.  She is requesting STI evaluation  -Ordered GC/CT  -Ordered vaginal pathogens.  Contact patient at [phone] with results.  Okay to leave voicemail    Differential diagnosis, natural history, supportive care, and indications for immediate follow-up discussed. All questions answered. Patient agrees with the plan of care.    Follow-up as needed if symptoms worsen or fail to improve to PCP, Urgent care or Emergency Room.    Please note that this dictation was created using voice recognition software. I have made a reasonable attempt to correct obvious errors, but I expect that there are errors of grammar and possibly content that I did not discover before finalizing the note.

## 2021-04-04 NOTE — LETTER
April 4, 2021       Patient: Korina Lyons   YOB: 1980   Date of Visit: 4/4/2021         To Whom It May Concern:    Korina Lyons is okay to return to work once the eye redness, discharge and itchy sensation have resolved.    If you have any questions or concerns, please don't hesitate to call [phone]      Sincerely,    Vincent Conrad D.O.  Electronically Signed

## 2021-04-05 ENCOUNTER — TELEPHONE (OUTPATIENT)
Dept: URGENT CARE | Facility: CLINIC | Age: 41
End: 2021-04-05

## 2021-04-05 DIAGNOSIS — A59.01 TRICHOMONAL VAGINITIS: ICD-10-CM

## 2021-04-05 LAB
C TRACH DNA SPEC QL NAA+PROBE: NEGATIVE
N GONORRHOEA DNA SPEC QL NAA+PROBE: NEGATIVE
SPECIMEN SOURCE: NORMAL

## 2021-04-05 RX ORDER — METRONIDAZOLE 500 MG/1
500 TABLET ORAL 2 TIMES DAILY
Qty: 14 TABLET | Refills: 0 | Status: SHIPPED | OUTPATIENT
Start: 2021-04-05 | End: 2021-12-03

## 2021-04-06 NOTE — TELEPHONE ENCOUNTER
Spoke with patient over the phone informing her of the + trich infection. Rx for flagyl sent to pharmacy. Informed her she cannot drink alcohol while taking medication. She was instructed to inform her partner and have him f/u w/ UC for treatment.

## 2021-05-12 ENCOUNTER — HOSPITAL ENCOUNTER (EMERGENCY)
Facility: MEDICAL CENTER | Age: 41
End: 2021-05-12
Attending: EMERGENCY MEDICINE
Payer: MEDICAID

## 2021-05-12 VITALS
OXYGEN SATURATION: 97 % | RESPIRATION RATE: 22 BRPM | BODY MASS INDEX: 32.44 KG/M2 | SYSTOLIC BLOOD PRESSURE: 112 MMHG | DIASTOLIC BLOOD PRESSURE: 72 MMHG | HEIGHT: 64 IN | WEIGHT: 190.04 LBS | HEART RATE: 75 BPM | TEMPERATURE: 98 F

## 2021-05-12 DIAGNOSIS — R11.0 NAUSEA: ICD-10-CM

## 2021-05-12 DIAGNOSIS — R51.9 ACUTE NONINTRACTABLE HEADACHE, UNSPECIFIED HEADACHE TYPE: ICD-10-CM

## 2021-05-12 LAB
ALBUMIN SERPL BCP-MCNC: 4.5 G/DL (ref 3.2–4.9)
ALBUMIN/GLOB SERPL: 1.4 G/DL
ALP SERPL-CCNC: 86 U/L (ref 30–99)
ALT SERPL-CCNC: 17 U/L (ref 2–50)
ANION GAP SERPL CALC-SCNC: 14 MMOL/L (ref 7–16)
AST SERPL-CCNC: 18 U/L (ref 12–45)
BASOPHILS # BLD AUTO: 0.3 % (ref 0–1.8)
BASOPHILS # BLD: 0.03 K/UL (ref 0–0.12)
BILIRUB SERPL-MCNC: 0.6 MG/DL (ref 0.1–1.5)
BUN SERPL-MCNC: 14 MG/DL (ref 8–22)
CALCIUM SERPL-MCNC: 9.7 MG/DL (ref 8.5–10.5)
CHLORIDE SERPL-SCNC: 103 MMOL/L (ref 96–112)
CO2 SERPL-SCNC: 21 MMOL/L (ref 20–33)
CREAT SERPL-MCNC: 0.94 MG/DL (ref 0.5–1.4)
EKG IMPRESSION: NORMAL
EOSINOPHIL # BLD AUTO: 0.03 K/UL (ref 0–0.51)
EOSINOPHIL NFR BLD: 0.3 % (ref 0–6.9)
ERYTHROCYTE [DISTWIDTH] IN BLOOD BY AUTOMATED COUNT: 41.3 FL (ref 35.9–50)
GLOBULIN SER CALC-MCNC: 3.2 G/DL (ref 1.9–3.5)
GLUCOSE SERPL-MCNC: 75 MG/DL (ref 65–99)
HCT VFR BLD AUTO: 47 % (ref 37–47)
HGB BLD-MCNC: 16 G/DL (ref 12–16)
IMM GRANULOCYTES # BLD AUTO: 0.04 K/UL (ref 0–0.11)
IMM GRANULOCYTES NFR BLD AUTO: 0.4 % (ref 0–0.9)
LYMPHOCYTES # BLD AUTO: 1.36 K/UL (ref 1–4.8)
LYMPHOCYTES NFR BLD: 14 % (ref 22–41)
MAGNESIUM SERPL-MCNC: 2.2 MG/DL (ref 1.5–2.5)
MCH RBC QN AUTO: 32.3 PG (ref 27–33)
MCHC RBC AUTO-ENTMCNC: 34 G/DL (ref 33.6–35)
MCV RBC AUTO: 94.8 FL (ref 81.4–97.8)
MONOCYTES # BLD AUTO: 0.71 K/UL (ref 0–0.85)
MONOCYTES NFR BLD AUTO: 7.3 % (ref 0–13.4)
NEUTROPHILS # BLD AUTO: 7.56 K/UL (ref 2–7.15)
NEUTROPHILS NFR BLD: 77.7 % (ref 44–72)
NRBC # BLD AUTO: 0 K/UL
NRBC BLD-RTO: 0 /100 WBC
PLATELET # BLD AUTO: 372 K/UL (ref 164–446)
PMV BLD AUTO: 9.4 FL (ref 9–12.9)
POTASSIUM SERPL-SCNC: 4.2 MMOL/L (ref 3.6–5.5)
PROT SERPL-MCNC: 7.7 G/DL (ref 6–8.2)
RBC # BLD AUTO: 4.96 M/UL (ref 4.2–5.4)
SODIUM SERPL-SCNC: 138 MMOL/L (ref 135–145)
WBC # BLD AUTO: 9.7 K/UL (ref 4.8–10.8)

## 2021-05-12 PROCEDURE — 85025 COMPLETE CBC W/AUTO DIFF WBC: CPT

## 2021-05-12 PROCEDURE — U0005 INFEC AGEN DETEC AMPLI PROBE: HCPCS

## 2021-05-12 PROCEDURE — 96375 TX/PRO/DX INJ NEW DRUG ADDON: CPT

## 2021-05-12 PROCEDURE — 700111 HCHG RX REV CODE 636 W/ 250 OVERRIDE (IP): Performed by: EMERGENCY MEDICINE

## 2021-05-12 PROCEDURE — 93005 ELECTROCARDIOGRAM TRACING: CPT

## 2021-05-12 PROCEDURE — 94760 N-INVAS EAR/PLS OXIMETRY 1: CPT

## 2021-05-12 PROCEDURE — 80053 COMPREHEN METABOLIC PANEL: CPT

## 2021-05-12 PROCEDURE — 96374 THER/PROPH/DIAG INJ IV PUSH: CPT

## 2021-05-12 PROCEDURE — 93005 ELECTROCARDIOGRAM TRACING: CPT | Performed by: EMERGENCY MEDICINE

## 2021-05-12 PROCEDURE — U0003 INFECTIOUS AGENT DETECTION BY NUCLEIC ACID (DNA OR RNA); SEVERE ACUTE RESPIRATORY SYNDROME CORONAVIRUS 2 (SARS-COV-2) (CORONAVIRUS DISEASE [COVID-19]), AMPLIFIED PROBE TECHNIQUE, MAKING USE OF HIGH THROUGHPUT TECHNOLOGIES AS DESCRIBED BY CMS-2020-01-R: HCPCS

## 2021-05-12 PROCEDURE — 83735 ASSAY OF MAGNESIUM: CPT

## 2021-05-12 PROCEDURE — 99284 EMERGENCY DEPT VISIT MOD MDM: CPT

## 2021-05-12 PROCEDURE — 700105 HCHG RX REV CODE 258: Performed by: EMERGENCY MEDICINE

## 2021-05-12 RX ORDER — POTASSIUM CHLORIDE 1.5 G/1.58G
20 POWDER, FOR SOLUTION ORAL 2 TIMES DAILY
Status: SHIPPED | COMMUNITY
End: 2021-12-03

## 2021-05-12 RX ORDER — DEXAMETHASONE SODIUM PHOSPHATE 4 MG/ML
12 INJECTION, SOLUTION INTRA-ARTICULAR; INTRALESIONAL; INTRAMUSCULAR; INTRAVENOUS; SOFT TISSUE ONCE
Status: COMPLETED | OUTPATIENT
Start: 2021-05-12 | End: 2021-05-12

## 2021-05-12 RX ORDER — DIPHENHYDRAMINE HYDROCHLORIDE 50 MG/ML
25 INJECTION INTRAMUSCULAR; INTRAVENOUS ONCE
Status: COMPLETED | OUTPATIENT
Start: 2021-05-12 | End: 2021-05-12

## 2021-05-12 RX ORDER — SODIUM CHLORIDE 9 MG/ML
1000 INJECTION, SOLUTION INTRAVENOUS ONCE
Status: COMPLETED | OUTPATIENT
Start: 2021-05-12 | End: 2021-05-12

## 2021-05-12 RX ORDER — METOCLOPRAMIDE HYDROCHLORIDE 5 MG/ML
10 INJECTION INTRAMUSCULAR; INTRAVENOUS ONCE
Status: COMPLETED | OUTPATIENT
Start: 2021-05-12 | End: 2021-05-12

## 2021-05-12 RX ADMIN — DIPHENHYDRAMINE HYDROCHLORIDE 25 MG: 50 INJECTION INTRAMUSCULAR; INTRAVENOUS at 16:40

## 2021-05-12 RX ADMIN — METOCLOPRAMIDE 10 MG: 5 INJECTION, SOLUTION INTRAMUSCULAR; INTRAVENOUS at 16:40

## 2021-05-12 RX ADMIN — DEXAMETHASONE SODIUM PHOSPHATE 12 MG: 4 INJECTION, SOLUTION INTRA-ARTICULAR; INTRALESIONAL; INTRAMUSCULAR; INTRAVENOUS; SOFT TISSUE at 16:39

## 2021-05-12 RX ADMIN — SODIUM CHLORIDE 1000 ML: 9 INJECTION, SOLUTION INTRAVENOUS at 16:40

## 2021-05-12 NOTE — ED NOTES
Pt reports she started a very low calorie weight loss program <500 kcal per day with medications and vitamin replacement. She is wondering if this contributes to the HA

## 2021-05-12 NOTE — ED TRIAGE NOTES
Chief Complaint   Patient presents with   • Migraine     x3 days     Pt ambulatory to triage for above complaint. Pt reports increased fatigue, dizziness, and nausea with migraine for past 3 days. Pt in NAD. EKG done in triage

## 2021-05-13 LAB
SARS-COV-2 RNA RESP QL NAA+PROBE: NOTDETECTED
SPECIMEN SOURCE: NORMAL

## 2021-05-13 NOTE — ED PROVIDER NOTES
"ED Provider Note    CHIEF COMPLAINT  Chief Complaint   Patient presents with   • Migraine     x3 days       HPI  Korina Lyons is a 40 y.o. female who presents to the emergency department through triage for headache and nausea.  Patient states this is her third week of a calorie restricted diet, 500 leoncio by no supplement shake only, in addition to phentermine, potassium and \"water pill.\"  Patient is also drinking a large amount of water daily.  She has had 3 days of extreme fatigue, headache and nausea.  No visual changes, focal weakness or paresthesias.  No neck pain or stiffness.  No fever or chills.  Nausea without vomiting or diarrhea.  No abdominal pain.  Denies cough or congestion.    REVIEW OF SYSTEMS  See HPI for further details. All other systems are negative.     PAST MEDICAL HISTORY   has a past medical history of Bipolar 1 disorder (HCC), Cancer (HCC) (2017), and Psychiatric problem.    SOCIAL HISTORY  Social History     Tobacco Use   • Smoking status: Former Smoker     Packs/day: 0.10     Years: 27.00     Pack years: 2.70     Types: Cigarettes     Quit date: 1/1/2018     Years since quitting: 3.3   • Smokeless tobacco: Never Used   Vaping Use   • Vaping Use: Every day   • Substances: Nicotine   • Devices: Pre-filled or refillable cartridge   Substance and Sexual Activity   • Alcohol use: Yes     Comment: occ   • Drug use: Yes     Types: Inhaled, Marijuana     Comment: Marijuana daily.    • Sexual activity: Yes     Partners: Male       SURGICAL HISTORY   has a past surgical history that includes dilation and curettage (12/17/2017); hysterectomy robotic xi (2/1/2018); salpingectomy (Bilateral, 2/1/2018); oophorectomy (Bilateral, 2/1/2018); and cystoscopy stent placement (Bilateral, 2/1/2018).    CURRENT MEDICATIONS  Home Medications     Reviewed by Vahid Dove R.N. (Registered Nurse) on 05/12/21 at 1434  Med List Status: Not Addressed   Medication Last Dose Status   baclofen (LIORESAL) 10 MG " "Tab  Active   benzonatate (TESSALON) 100 MG Cap  Active   hydrOXYzine HCl (ATARAX) 25 MG Tab  Active   ibuprofen (MOTRIN) 800 MG Tab  Active   lamoTRIgine (LAMICTAL) 100 MG Tab  Active   metroNIDAZOLE (FLAGYL) 500 MG Tab  Active   naproxen (NAPROSYN) 500 MG Tab  Active   omeprazole (PRILOSEC) 20 MG delayed-release capsule  Active   polymixin-trimethoprim (POLYTRIM) 79094-3.1 UNIT/ML-% Solution  Active                ALLERGIES  Allergies   Allergen Reactions   • Seroquel [Quetiapine Fumarate]      Restless leg         PHYSICAL EXAM  VITAL SIGNS: /71   Pulse 71   Temp 36.7 °C (98 °F) (Temporal)   Resp (!) 22   Ht 1.626 m (5' 4\")   Wt 86.2 kg (190 lb 0.6 oz)   LMP 01/20/2018   SpO2 98%   BMI 32.62 kg/m²   Pulse ox interpretation: I interpret this pulse ox as normal.  Constitutional: Alert in no apparent distress.  HENT: Normocephalic, atraumatic. Bilateral external ears normal, Nose normal.  Dry lips and mucous membranes.  Lips peeling..    Eyes: Pupils are equal and reactive, Conjunctiva normal.   Neck: Normal range of motion, Supple.  No meningeal irritation.  Mild tachycardia otherwise  Lymphatic: No lymphadenopathy noted.   Cardiovascular: Regular rate and rhythm, no murmurs. Distal pulses intact.  No peripheral edema.  Thorax & Lungs: Normal breath sounds.  No wheezing/rales/ronchi. No increased work of breathing, clipped speech or retractions.   Abdomen: Soft, non-distended, non-tender to palpation.   Skin: Warm, Dry, No erythema, No rash.   Musculoskeletal: Good range of motion in all major joints.   Neurologic: Aler and orient x4.  Speech clear and cohesive.  Moves 4 extremities spontaneously.  Ambulates independently.  Psychiatric: Affect normal, Judgment normal, Mood normal.       DIAGNOSTIC STUDIES / PROCEDURES    LABS  Results for orders placed or performed during the hospital encounter of 05/12/21   CBC WITH DIFFERENTIAL   Result Value Ref Range    WBC 9.7 4.8 - 10.8 K/uL    RBC 4.96 4.20 - " 5.40 M/uL    Hemoglobin 16.0 12.0 - 16.0 g/dL    Hematocrit 47.0 37.0 - 47.0 %    MCV 94.8 81.4 - 97.8 fL    MCH 32.3 27.0 - 33.0 pg    MCHC 34.0 33.6 - 35.0 g/dL    RDW 41.3 35.9 - 50.0 fL    Platelet Count 372 164 - 446 K/uL    MPV 9.4 9.0 - 12.9 fL    Neutrophils-Polys 77.70 (H) 44.00 - 72.00 %    Lymphocytes 14.00 (L) 22.00 - 41.00 %    Monocytes 7.30 0.00 - 13.40 %    Eosinophils 0.30 0.00 - 6.90 %    Basophils 0.30 0.00 - 1.80 %    Immature Granulocytes 0.40 0.00 - 0.90 %    Nucleated RBC 0.00 /100 WBC    Neutrophils (Absolute) 7.56 (H) 2.00 - 7.15 K/uL    Lymphs (Absolute) 1.36 1.00 - 4.80 K/uL    Monos (Absolute) 0.71 0.00 - 0.85 K/uL    Eos (Absolute) 0.03 0.00 - 0.51 K/uL    Baso (Absolute) 0.03 0.00 - 0.12 K/uL    Immature Granulocytes (abs) 0.04 0.00 - 0.11 K/uL    NRBC (Absolute) 0.00 K/uL   COMP METABOLIC PANEL   Result Value Ref Range    Sodium 138 135 - 145 mmol/L    Potassium 4.2 3.6 - 5.5 mmol/L    Chloride 103 96 - 112 mmol/L    Co2 21 20 - 33 mmol/L    Anion Gap 14.0 7.0 - 16.0    Glucose 75 65 - 99 mg/dL    Bun 14 8 - 22 mg/dL    Creatinine 0.94 0.50 - 1.40 mg/dL    Calcium 9.7 8.5 - 10.5 mg/dL    AST(SGOT) 18 12 - 45 U/L    ALT(SGPT) 17 2 - 50 U/L    Alkaline Phosphatase 86 30 - 99 U/L    Total Bilirubin 0.6 0.1 - 1.5 mg/dL    Albumin 4.5 3.2 - 4.9 g/dL    Total Protein 7.7 6.0 - 8.2 g/dL    Globulin 3.2 1.9 - 3.5 g/dL    A-G Ratio 1.4 g/dL   MAGNESIUM   Result Value Ref Range    Magnesium 2.2 1.5 - 2.5 mg/dL   ESTIMATED GFR   Result Value Ref Range    GFR If African American >60 >60 mL/min/1.73 m 2    GFR If Non African American >60 >60 mL/min/1.73 m 2   EKG   Result Value Ref Range    Report       [facility] Emergency Dept.    Test Date:  2021  Pt Name:    NORY PATEL                Department: ER  MRN:        [id number]                      Room:  Gender:     Female                       Technician: [id number]  :        1980                   Requested By:ER  TRIAGE PROTOCOL  Order #:    [id number]                    Reading MD: VENKATA NORIEGA DO    Measurements  Intervals                                Axis  Rate:       99                           P:          78  NJ:         132                          QRS:        77  QRSD:       80                           T:          68  QT:         352  QTc:        452    Interpretive Statements  SINUS RHYTHM  BASELINE WANDER IN LEAD(S) V1,V2,V3,V4,V5,V6  No previous ECG available for comparison  Electronically Signed On 5- 17:58:37 PDT by VENKATA NORIEGA DO           COURSE & MEDICAL DECISION MAKING  Nursing notes and vital signs were reviewed. (See chart for details)  The patients records were reviewed, history was obtained from the patient;     ED evaluation for headache and nausea really unrevealing, although clinically patient was quite dehydrated.  She is on a calorie restricted diet in addition to the use of phentermine and a diuretic.  Symptoms have resolved she is feeling much better after IV fluid bolus as well as Reglan, Benadryl and Decadron for headache.  She was neurologically intact and nonfocal.  She has had similar headache previously but does not have regular headaches.  There is no history or clinical evidence to suggest subarachnoid hemorrhage, stroke or other mass-effect.  Labs are unremarkable, no electrolyte derangement despite above history.  She is hemodynamically stable, mild tachycardia improved with fluid bolus, she was never febrile or hypotensive.  No clinical evidence for sepsis.  No meningitis.  Cannot exclude a viral syndrome.  Patient has have Covid test for return to work and this has been performed, should be available within 24 hours.    Patient is stable for discharge at this time, anticipatory guidance provided, close follow-up is encouraged, and strict ED return instructions have been detailed. Patient is agreeable to the disposition and plan.    Patient's blood pressure was  elevated in the emergency department, and has been referred to primary care for close monitoring.    FINAL IMPRESSION  (R51.9) Acute nonintractable headache, unspecified headache type  (R11.0) Nausea      Electronically signed by: Deanna Alejo D.O., 5/12/2021 5:53 PM      This dictation was created using voice recognition software. The accuracy of the dictation is limited to the abilities of the software. I expect there may be some errors of grammar and possibly content. The nursing notes were reviewed and certain aspects of this information were incorporated into this note.

## 2021-05-13 NOTE — DISCHARGE INSTRUCTIONS
Follow-up with primary care 1 to 2 days for reevaluation, medication management.    Follow-up with weight management physician as well, discussed with him that your symptoms, encourage medication adjustment  if indicated.    Your Covid test results, as requested for return to work, will be available through [employer] tomorrow.    Encourage oral fluid hydration, balanced diet and gentle exercise, but discussed with physicians first.    Return to emergency department for intractable headache, altered mental status, seizure, focal weakness, fever, vomiting, syncope, difficulty breathing or other new concerns.

## 2021-05-13 NOTE — ED NOTES
DC home with written and verbal education on dehydration and HA. She has been instructed to follow up with weight loss MD and primary care ASAP. She will return for worsening symptoms.

## 2021-07-05 ENCOUNTER — APPOINTMENT (OUTPATIENT)
Dept: RADIOLOGY | Facility: MEDICAL CENTER | Age: 41
End: 2021-07-05
Attending: EMERGENCY MEDICINE
Payer: MEDICAID

## 2021-07-05 ENCOUNTER — HOSPITAL ENCOUNTER (EMERGENCY)
Facility: MEDICAL CENTER | Age: 41
End: 2021-07-05
Attending: EMERGENCY MEDICINE
Payer: MEDICAID

## 2021-07-05 VITALS
BODY MASS INDEX: 31.24 KG/M2 | DIASTOLIC BLOOD PRESSURE: 72 MMHG | HEIGHT: 64 IN | WEIGHT: 182.98 LBS | TEMPERATURE: 98 F | SYSTOLIC BLOOD PRESSURE: 115 MMHG | RESPIRATION RATE: 16 BRPM | HEART RATE: 75 BPM | OXYGEN SATURATION: 98 %

## 2021-07-05 DIAGNOSIS — M25.562 ACUTE PAIN OF LEFT KNEE: ICD-10-CM

## 2021-07-05 PROCEDURE — A9270 NON-COVERED ITEM OR SERVICE: HCPCS | Performed by: EMERGENCY MEDICINE

## 2021-07-05 PROCEDURE — 73564 X-RAY EXAM KNEE 4 OR MORE: CPT | Mod: LT

## 2021-07-05 PROCEDURE — 700102 HCHG RX REV CODE 250 W/ 637 OVERRIDE(OP): Performed by: EMERGENCY MEDICINE

## 2021-07-05 PROCEDURE — 99284 EMERGENCY DEPT VISIT MOD MDM: CPT

## 2021-07-05 RX ORDER — NAPROXEN 500 MG/1
500 TABLET ORAL ONCE
Status: COMPLETED | OUTPATIENT
Start: 2021-07-05 | End: 2021-07-05

## 2021-07-05 RX ORDER — NAPROXEN 500 MG/1
500 TABLET ORAL 2 TIMES DAILY WITH MEALS
Qty: 60 TABLET | Refills: 0 | Status: SHIPPED | OUTPATIENT
Start: 2021-07-05 | End: 2021-12-03

## 2021-07-05 RX ADMIN — NAPROXEN 500 MG: 500 TABLET ORAL at 17:49

## 2021-07-05 ASSESSMENT — ENCOUNTER SYMPTOMS: SENSORY CHANGE: 0

## 2021-07-05 ASSESSMENT — FIBROSIS 4 INDEX: FIB4 SCORE: 0.49

## 2021-07-05 NOTE — ED TRIAGE NOTES
"Chief Complaint   Patient presents with   • Knee Pain     Pt BIB self to triage. pt reports to be having a \"pop\" in left knee on Saturday. pt denies injury and now hard to bear weight.          "

## 2021-07-05 NOTE — ED PROVIDER NOTES
"ED Provider Note    Primary care provider: Gil Patel M.D.  Means of arrival: Private vehicle  History obtained from: Patient   History limited by: none    CHIEF COMPLAINT  Chief Complaint   Patient presents with   • Knee Pain     Pt BIB self to triage. pt reports to be having a \"pop\" in left knee on Saturday. pt denies injury and now hard to bear weight.        HPI  Korina Lyons is a 40 y.o. female who presents to the Emergency Department complaining of left knee pain onset 2 days ago upon standing up. States she stood up and felt a \"pop\" in her left knee, along with sudden onset of pain, causing her to fall onto her bottom. She denies injuries from the fall. She has had pain with ambulation since that time. She has noticed some mild swelling. No discoloration. No calf pain. Denies chest pain or shortness of breath. She has had similar symptoms intermittently over the past 1.5 years or so but has not yet followed up with specialist for this.    REVIEW OF SYSTEMS  Review of Systems   Musculoskeletal:        Positive for knee pain   Neurological: Negative for sensory change.         PAST MEDICAL HISTORY   has a past medical history of Bipolar 1 disorder (HCC), Cancer ([facility]) (2017), and Psychiatric problem.    SURGICAL HISTORY   has a past surgical history that includes dilation and curettage (12/17/2017); hysterectomy robotic xi (2/1/2018); salpingectomy (Bilateral, 2/1/2018); oophorectomy (Bilateral, 2/1/2018); and cystoscopy stent placement (Bilateral, 2/1/2018).    SOCIAL HISTORY  Social History     Tobacco Use   • Smoking status: Current Some Day Smoker     Packs/day: 0.10     Years: 27.00     Pack years: 2.70     Types: Cigarettes     Last attempt to quit: 1/1/2018     Years since quitting: 3.5   • Smokeless tobacco: Never Used   Vaping Use   • Vaping Use: Every day   • Substances: Nicotine   • Devices: Pre-filled or refillable cartridge   Substance Use Topics   • Alcohol use: Yes     Comment: occ " "  • Drug use: Yes     Types: Inhaled, Marijuana     Comment: Marijuana daily.       Social History     Substance and Sexual Activity   Drug Use Yes   • Types: Inhaled, Marijuana    Comment: Marijuana daily.        FAMILY HISTORY  Family History   Problem Relation Age of Onset   • Cancer Maternal Aunt         ovary   • Cancer Maternal Uncle         ?   • Genetic Disorder Maternal Grandfather         lung   • Cancer Maternal Grandfather         lung       CURRENT MEDICATIONS  Home Medications    **Home medications have not yet been reviewed for this encounter**         ALLERGIES  Allergies   Allergen Reactions   • Seroquel [Quetiapine Fumarate]      Restless leg         PHYSICAL EXAM  VITAL SIGNS: /75   Pulse 78   Temp 36.8 °C (98.2 °F) (Temporal)   Resp 15   Ht 1.626 m (5' 4\")   Wt 83 kg (182 lb 15.7 oz)   LMP 01/20/2018   SpO2 97%   BMI 31.41 kg/m²   Vitals reviewed by myself.  Physical Exam  Constitutional:       General: She is not in acute distress.     Appearance: Normal appearance.   Eyes:      Extraocular Movements: Extraocular movements intact.      Pupils: Pupils are equal, round, and reactive to light.   Cardiovascular:      Rate and Rhythm: Normal rate and regular rhythm.   Pulmonary:      Effort: Pulmonary effort is normal.   Musculoskeletal:      Cervical back: Normal range of motion.      Comments: Patient has full range of motion of her knees bilaterally although this causes pain in her left knee.  Tenderness to palpation along the medial joint line of the left knee.  No obvious ligamentous laxity on exam.  No obvious swelling or deformity noted on exam.   Neurological:      Mental Status: She is alert.      Comments: Sensation intact in bilateral lower extremities           DIAGNOSTIC STUDIES /  LABS      RADIOLOGY  DX-KNEE COMPLETE 4+ LEFT   Final Result      No acute fracture or significant arthropathy.        The radiologist's interpretation of all radiological studies have been " reviewed by me.        COURSE & MEDICAL DECISION MAKING  Nursing notes, VS, PMSFHx reviewed in chart.    Patient is a 40-year-old female who comes in for evaluation of left knee pain.  Differential diagnosis includes strain, sprain, fracture, dislocation.  Diagnostic work-up includes left knee x-ray.    Patient is initial vitals are within normal limits and she is neurovascularly intact on exam.  She is treated with naproxen for discomfort.  X-ray returns demonstrates no acute fracture or arthropathy.  I advised patient she likely has ligamentous injury versus tendinitis.  We will place her in a knee immobilizer and start her on naproxen and have her follow-up with [facility] for ongoing evaluation.  Patient is amenable to this plan.  She is given strict return precautions and discharged in stable condition.      FINAL IMPRESSION  1. Acute pain of left knee

## 2021-07-06 NOTE — ED NOTES
Pt given discharge instructions/prescription sent to pharm of choosing/ home care instructions explained, pt verbalized understanding of instructions given/pt understands the importance of follow up, pt ambulatory to ER melissa.

## 2021-07-21 ENCOUNTER — HOSPITAL ENCOUNTER (OUTPATIENT)
Dept: RADIOLOGY | Facility: MEDICAL CENTER | Age: 41
End: 2021-07-21
Attending: PHYSICIAN ASSISTANT
Payer: MEDICAID

## 2021-07-21 DIAGNOSIS — M25.562 ACUTE PAIN OF LEFT KNEE: ICD-10-CM

## 2021-07-21 PROCEDURE — 73721 MRI JNT OF LWR EXTRE W/O DYE: CPT | Mod: LT

## 2021-10-28 PROBLEM — G56.03 CARPAL TUNNEL SYNDROME ON BOTH SIDES: Status: ACTIVE | Noted: 2021-10-28

## 2021-10-28 PROBLEM — S83.015A LATERAL DISLOCATION OF LEFT PATELLA: Status: ACTIVE | Noted: 2021-10-28

## 2021-10-28 PROBLEM — M23.42 CHONDRAL LOOSE BODY OF LEFT KNEE JOINT: Status: ACTIVE | Noted: 2021-10-28

## 2021-12-03 ENCOUNTER — OFFICE VISIT (OUTPATIENT)
Dept: MEDICAL GROUP | Facility: CLINIC | Age: 41
End: 2021-12-03
Payer: MEDICAID

## 2021-12-03 VITALS
BODY MASS INDEX: 29.19 KG/M2 | OXYGEN SATURATION: 98 % | TEMPERATURE: 97 F | HEIGHT: 64 IN | HEART RATE: 88 BPM | RESPIRATION RATE: 16 BRPM | SYSTOLIC BLOOD PRESSURE: 123 MMHG | WEIGHT: 171 LBS | DIASTOLIC BLOOD PRESSURE: 80 MMHG

## 2021-12-03 DIAGNOSIS — F31.9 BIPOLAR AFFECTIVE DISORDER, REMISSION STATUS UNSPECIFIED (HCC): ICD-10-CM

## 2021-12-03 DIAGNOSIS — M25.562 LEFT KNEE PAIN, UNSPECIFIED CHRONICITY: ICD-10-CM

## 2021-12-03 PROCEDURE — 99204 OFFICE O/P NEW MOD 45 MIN: CPT | Mod: GC | Performed by: STUDENT IN AN ORGANIZED HEALTH CARE EDUCATION/TRAINING PROGRAM

## 2021-12-03 RX ORDER — HYDROXYZINE HYDROCHLORIDE 25 MG/1
25 TABLET, FILM COATED ORAL 3 TIMES DAILY PRN
Qty: 90 TABLET | Refills: 1 | Status: SHIPPED | OUTPATIENT
Start: 2021-12-03

## 2021-12-03 ASSESSMENT — FIBROSIS 4 INDEX: FIB4 SCORE: 0.5

## 2021-12-04 NOTE — ASSESSMENT & PLAN NOTE
- per patient has bone chip in knee  - injury when she was dancing 2010 and has had popping/clicking/catching since then  - plan was for surgery but the [facility] no longer excepts her insurance  - Patient requesting a new referral to a different ortho doctor    - Referral to ortho

## 2021-12-04 NOTE — PROGRESS NOTES
[facility]     PATIENT ID:  NAME:  Korina Lyons  MRN:               [id number]  YOB: 1980    Date: 4:29 PM      Resident: Gonzalo King DO    CC:  New patient      HPI: Korina Lyons is a 41 y.o. female who presented as a new patient    Problem   Bipolar Disorder (Hcc)    - Patient w/ history of bipolar disorder  - Currently not taking any medicine  - In the past - took seroquel and depakote but disliked side effects  - diagnosed as bipolar by [facility] in [place]  - patient reports mood swings and depressive symptoms   - has been awake in the past for more than 3 days - notes she gambles and does shopping   Last manic episode November 2021  - no SI and HI  - notes she has days where she is depressed and can't get out of bed.      Left Knee Pain    - per patient has bone chip in knee  - injury when she was dancing 2010 and has had popping/clicking/catching since then  - plan was for surgery but the [employer] no longer excepts her insurance  - Patient requesting a new referral to a different ortho doctor         REVIEW OF SYSTEMS:   Ten systems reviewed and were negative except as noted in the HPI.                PROBLEM LIST  Patient Active Problem List   Diagnosis   • Cervical cancer (HCC)   • Postoperative pain   • Lymph cyst   • History of radical hysterectomy   • Lateral dislocation of left patella   • Chondral loose body of left knee joint   • Carpal tunnel syndrome on both sides   • Bipolar disorder (HCC)   • Left knee pain        PAST SURGICAL HISTORY:  Past Surgical History:   Procedure Laterality Date   • HYSTERECTOMY ROBOTIC XI  2/1/2018    Procedure: HYSTERECTOMY ROBOTIC XI- RADICAL;  Surgeon: Tito Jensen M.D.;  Location: SURGERY [facility];  Service: Gynecology   • SALPINGECTOMY Bilateral 2/1/2018    Procedure: SALPINGECTOMY;  Surgeon: Tito Jensen M.D.;  Location: [facility];  Service: Gynecology   • OOPHORECTOMY Bilateral 2/1/2018    Procedure: OOPHOROPEXY;  Surgeon:  "Tito Jensen M.D.;  Location: SURGERY [facility];  Service: Gynecology   • CYSTOSCOPY STENT PLACEMENT Bilateral 2/1/2018    Procedure: CYSTOSCOPY STENT PLACEMENT- URETERAL;  Surgeon: Tito Jensen M.D.;  Location: SURGERY [facility];  Service: Gynecology   • DILATION AND CURETTAGE  12/17/2017    Procedure: BIOPSY OF CERVICAL MASS;  Surgeon: Denis Keating M.D.;  Location: SURGERY [facility];  Service: Gynecology       FAMILY HISTORY:  Family History   Problem Relation Age of Onset   • Cancer Maternal Aunt         ovary   • Cancer Maternal Uncle         ?   • Genetic Disorder Maternal Grandfather         lung   • Cancer Maternal Grandfather         lung       SOCIAL HISTORY:   Social History     Tobacco Use   • Smoking status: Former Smoker     Packs/day: 0.10     Years: 27.00     Pack years: 2.70     Types: Cigarettes     Quit date: 1/1/2018     Years since quitting: 3.9   • Smokeless tobacco: Never Used   Substance Use Topics   • Alcohol use: Not Currently     Comment: occ       ALLERGIES:  Allergies   Allergen Reactions   • Seroquel [Quetiapine Fumarate]      Restless leg         OUTPATIENT MEDICATIONS:    Current Outpatient Medications:   •  hydrOXYzine HCl (ATARAX) 25 MG Tab, Take 1 Tablet by mouth 3 times a day as needed for Itching., Disp: 90 Tablet, Rfl: 1    PHYSICAL EXAM:  Vitals:    12/03/21 1552   BP: 123/80   Pulse: 88   Resp: 16   Temp: 36.1 °C (97 °F)   SpO2: 98%   Weight: 77.6 kg (171 lb)   Height: 1.626 m (5' 4\")       General: Pt resting in NAD, cooperative   Skin:  Pink, warm and dry.  HEENT: NC/AT. EOMI.  Lungs:  Symmetrical.  CTAB, good air movement   Cardiovascular:  S1/S2 RRR   Extremities:  Full range of motion.  CNS:  Muscle tone is normal. No gross focal neurologic deficits      ASSESSMENT/PLAN:   41 y.o. female     Problem List Items Addressed This Visit     Bipolar disorder (HCC)     - Patient w/ history of bipolar disorder  - Currently not taking any medicine  - In the " past - took seroquel and depakote but disliked side effects  - diagnosed as bipolar by [facility] in [place]  - patient reports mood swings and depressive symptoms   - has been awake in the past for more than 3 days - notes she gambles and does shopping   Last manic episode November 2021  - no SI and HI  - notes she has days where she is depressed and can't get out of bed.       - referral to psychiatry   - Check TSH  - atarax PRN prescribed for patient now  - F/u in 1-2 months         Relevant Medications    hydrOXYzine HCl (ATARAX) 25 MG Tab    Other Relevant Orders    Referral to Psychiatry    TSH WITH REFLEX TO FT4    Left knee pain     - per patient has bone chip in knee  - injury when she was dancing 2010 and has had popping/clicking/catching since then  - plan was for surgery but the [employer] no longer excepts her insurance  - Patient requesting a new referral to a different ortho doctor    - Referral to ortho          Relevant Orders    Referral to Orthopedics          Gonzalo King DO  PGY-3  [facility]

## 2021-12-04 NOTE — ASSESSMENT & PLAN NOTE
- Patient w/ history of bipolar disorder  - Currently not taking any medicine  - In the past - took seroquel and depakote but disliked side effects  - diagnosed as bipolar by [facility] in [place]  - patient reports mood swings and depressive symptoms   - has been awake in the past for more than 3 days - notes she gambles and does shopping   Last manic episode November 2021  - no SI and HI  - notes she has days where she is depressed and can't get out of bed.       - referral to psychiatry   - Check TSH  - atarax PRN prescribed for patient now  - F/u in 1-2 months

## 2022-02-23 ENCOUNTER — HOSPITAL ENCOUNTER (OUTPATIENT)
Facility: MEDICAL CENTER | Age: 42
End: 2022-02-23
Attending: STUDENT IN AN ORGANIZED HEALTH CARE EDUCATION/TRAINING PROGRAM
Payer: MEDICAID

## 2022-02-23 ENCOUNTER — OFFICE VISIT (OUTPATIENT)
Dept: URGENT CARE | Facility: CLINIC | Age: 42
End: 2022-02-23
Payer: MEDICAID

## 2022-02-23 VITALS
OXYGEN SATURATION: 99 % | SYSTOLIC BLOOD PRESSURE: 100 MMHG | DIASTOLIC BLOOD PRESSURE: 60 MMHG | HEIGHT: 64 IN | HEART RATE: 86 BPM | BODY MASS INDEX: 32.3 KG/M2 | TEMPERATURE: 97.9 F | WEIGHT: 189.2 LBS | RESPIRATION RATE: 18 BRPM

## 2022-02-23 DIAGNOSIS — R53.81 MALAISE AND FATIGUE: ICD-10-CM

## 2022-02-23 DIAGNOSIS — R53.83 MALAISE AND FATIGUE: ICD-10-CM

## 2022-02-23 PROCEDURE — 99213 OFFICE O/P EST LOW 20 MIN: CPT | Performed by: STUDENT IN AN ORGANIZED HEALTH CARE EDUCATION/TRAINING PROGRAM

## 2022-02-23 PROCEDURE — U0005 INFEC AGEN DETEC AMPLI PROBE: HCPCS

## 2022-02-23 PROCEDURE — U0003 INFECTIOUS AGENT DETECTION BY NUCLEIC ACID (DNA OR RNA); SEVERE ACUTE RESPIRATORY SYNDROME CORONAVIRUS 2 (SARS-COV-2) (CORONAVIRUS DISEASE [COVID-19]), AMPLIFIED PROBE TECHNIQUE, MAKING USE OF HIGH THROUGHPUT TECHNOLOGIES AS DESCRIBED BY CMS-2020-01-R: HCPCS

## 2022-02-23 ASSESSMENT — ENCOUNTER SYMPTOMS
MYALGIAS: 1
FEVER: 1
CHILLS: 1
COUGH: 1

## 2022-02-23 ASSESSMENT — FIBROSIS 4 INDEX: FIB4 SCORE: 0.5

## 2022-02-23 NOTE — LETTER
February 23, 2022    To Whom It May Concern:         This is confirmation that Korina Lyons attended her scheduled appointment with Leopoldo Quiros M.D. on 2/23/22.  Patient may work from home until results of her COVID-19 PCR test are finalized.         If you have any questions please do not hesitate to call me at the phone number listed below.    Sincerely,          Leopoldo Quiros M.D.  [phone]

## 2022-02-23 NOTE — PROGRESS NOTES
"Subjective     Korina Alesia Lyons is a 41 y.o. female who presents with Migraine (Congestion, upper sinuses, runny nose, cough. X 3 days. (needs note for work) Tested at [facility], rapid negative. )            Patient is a very agreeable 41-year-old female who presents clinic with subjective fevers rigors chills generalized malaise cough.  She has been vaccinated for COVID-19 x3.  Patient presents to clinic for COVID-19 testing via PCR.      Review of Systems   Constitutional: Positive for chills, fever and malaise/fatigue.   HENT: Positive for congestion.    Respiratory: Positive for cough.    Musculoskeletal: Positive for myalgias.   All other systems reviewed and are negative.             Objective     /60   Pulse 86   Temp 36.6 °C (97.9 °F)   Resp 18   Ht 1.626 m (5' 4\")   Wt 85.8 kg (189 lb 3.2 oz)   LMP 01/20/2018   SpO2 99%   BMI 32.48 kg/m²      Physical Exam  Vitals reviewed.   Constitutional:       Appearance: Normal appearance. She is ill-appearing.   HENT:      Nose: Congestion and rhinorrhea present.   Neurological:      Mental Status: She is alert.                             Assessment & Plan        1. Malaise and fatigue  41-year-old female with subjective fevers rigors chills and general malaise cough.  Plan:  1.  COVID-19 PCR testing    Patient was counseled that her results to be available in 24 to 48 hours via her MyChart on her mobile device.  Patient Dors understanding.  Patient was counseled that should she develop symptoms of shortness of breath difficulty breathing go to the nearest emergency department.  Patient endorsed understanding.  - SARS-CoV-2 PCR (24 hour In-House): Collect NP swab in VTM; Future                "

## 2022-02-24 DIAGNOSIS — R53.83 MALAISE AND FATIGUE: ICD-10-CM

## 2022-02-24 DIAGNOSIS — R53.81 MALAISE AND FATIGUE: ICD-10-CM

## 2022-02-24 LAB
COVID ORDER STATUS COVID19: NORMAL
SARS-COV-2 RNA RESP QL NAA+PROBE: NOTDETECTED
SPECIMEN SOURCE: NORMAL

## 2022-03-22 ENCOUNTER — OFFICE VISIT (OUTPATIENT)
Dept: URGENT CARE | Facility: CLINIC | Age: 42
End: 2022-03-22
Payer: MEDICAID

## 2022-03-22 VITALS
BODY MASS INDEX: 33.8 KG/M2 | OXYGEN SATURATION: 100 % | WEIGHT: 198 LBS | DIASTOLIC BLOOD PRESSURE: 74 MMHG | HEIGHT: 64 IN | HEART RATE: 94 BPM | TEMPERATURE: 98.4 F | SYSTOLIC BLOOD PRESSURE: 122 MMHG | RESPIRATION RATE: 14 BRPM

## 2022-03-22 DIAGNOSIS — K08.9 CHRONIC DENTAL PAIN: ICD-10-CM

## 2022-03-22 DIAGNOSIS — G89.29 CHRONIC DENTAL PAIN: ICD-10-CM

## 2022-03-22 DIAGNOSIS — K05.6 PERIODONTAL DISEASE: ICD-10-CM

## 2022-03-22 PROCEDURE — 99214 OFFICE O/P EST MOD 30 MIN: CPT | Performed by: STUDENT IN AN ORGANIZED HEALTH CARE EDUCATION/TRAINING PROGRAM

## 2022-03-22 RX ORDER — PHENTERMINE HYDROCHLORIDE 30 MG/1
30 CAPSULE ORAL EVERY MORNING
COMMUNITY
End: 2022-04-11

## 2022-03-22 RX ORDER — CLINDAMYCIN HYDROCHLORIDE 300 MG/1
300 CAPSULE ORAL 3 TIMES DAILY
Qty: 21 CAPSULE | Refills: 0 | Status: SHIPPED | OUTPATIENT
Start: 2022-03-22 | End: 2022-03-29

## 2022-03-22 ASSESSMENT — FIBROSIS 4 INDEX: FIB4 SCORE: 0.5

## 2022-03-22 NOTE — PROGRESS NOTES
Subjective:   CHIEF COMPLAINT  Chief Complaint   Patient presents with   • Dental Pain     X 9 months off and on, Bottom front tooth.  Unable to get into Dental until May.        HPI  Korina Lyons is a 41 y.o. female with a history of periodontal disease likely secondary to chronic tobacco abuse, no longer smokes presents with a chief complaint of a loose tooth associated with dental pain.  Says a while back she had some work done on her teeth, but following the dental procedure tooth #24 became somewhat loose.  Then in August 2021 she was eating a salad, bit a crouton and the loose became much more painful and more loose.  Says the tooth pain has been getting worse over the last couple weeks.  She has been taking ibuprofen 800 mg which provided nominal relief.  She is also been using chlorhexidine mouthwash.  She has an appointment to establish with a dentist in 2 months.  No fevers, chills, nausea or vomiting.    REVIEW OF SYSTEMS  General: no fever or chills  GI: no nausea or vomiting  See HPI for further details.    PAST MEDICAL HISTORY  Patient Active Problem List    Diagnosis Date Noted   • Bipolar disorder (HCC) 12/03/2021   • Left knee pain 12/03/2021   • Lateral dislocation of left patella 10/28/2021   • Chondral loose body of left knee joint 10/28/2021   • Carpal tunnel syndrome on both sides 10/28/2021   • History of radical hysterectomy 03/16/2018   • Lymph cyst 02/13/2018   • Cervical cancer (HCC) 02/12/2018   • Postoperative pain 02/12/2018       SURGICAL HISTORY   has a past surgical history that includes dilation and curettage (12/17/2017); hysterectomy robotic xi (2/1/2018); salpingectomy (Bilateral, 2/1/2018); oophorectomy (Bilateral, 2/1/2018); and cystoscopy stent placement (Bilateral, 2/1/2018).    ALLERGIES  Allergies   Allergen Reactions   • Seroquel [Quetiapine Fumarate]      Restless leg         CURRENT MEDICATIONS  Home Medications     Reviewed by Vincent Conrad D.O. (Physician)  "on 22 at 1653  Med List Status: <None>   Medication Last Dose Status   hydrOXYzine HCl (ATARAX) 25 MG Tab Taking Active   phentermine 30 MG capsule Taking Active                SOCIAL HISTORY  Social History     Tobacco Use   • Smoking status: Former Smoker     Packs/day: 0.10     Years: 27.00     Pack years: 2.70     Types: Cigarettes     Quit date: 2018     Years since quittin.2   • Smokeless tobacco: Never Used   Vaping Use   • Vaping Use: Every day   • Substances: Nicotine   • Devices: Pre-filled or refillable cartridge   Substance and Sexual Activity   • Alcohol use: Not Currently     Comment: occ   • Drug use: Not Currently     Types: Inhaled, Marijuana     Comment: Marijuana daily.    • Sexual activity: Yes     Partners: Male       FAMILY HISTORY  Family History   Problem Relation Age of Onset   • Cancer Maternal Aunt         ovary   • Cancer Maternal Uncle         ?   • Genetic Disorder Maternal Grandfather         lung   • Cancer Maternal Grandfather         lung          Objective:   PHYSICAL EXAM  VITAL SIGNS: /74   Pulse 94   Temp 36.9 °C (98.4 °F) (Temporal)   Resp 14   Ht 1.626 m (5' 4\")   Wt 89.8 kg (198 lb)   LMP 2018   SpO2 100%   BMI 33.99 kg/m²     Gen: no acute distress, normal voice  Skin: dry, intact, moist mucosal membranes  ENT: Diffuse peritoneal disease.  The root of tooth #24 was almost fully.  No obvious abscess.    Lungs: CTAB w/ symmetric expansion  CV: RRR w/o murmurs or clicks  Psych: normal affect, normal judgement, alert, awake    Assessment/Plan:     1. Chronic dental pain  clindamycin (CLEOCIN) 300 MG Cap   2. Periodontal disease  clindamycin (CLEOCIN) 300 MG Cap   Patient with chronic periodontal disease, likely secondary to tobacco abuse, here with chronic tooth pain with an acute exacerbation.  Fortunately patient no longer uses tobacco.  She has a scheduled appointment to establish with a dentist in  2 months.  I sent a prescription for " clindamycin to cover for any underlying infection.  She was instructed to continue using chlorhexidine mouth rinse.  Okay to take ibuprofen 800 mg 3 times daily as needed for the pain.  Additionally recommended taking Tylenol 1000 mg tid as needed for pain. Return to urgent care any new/worsening symptoms or further questions or concerns.  Patient understood everything discussed.  All questions were answered.    Differential diagnosis, natural history, supportive care, and indications for immediate follow-up discussed. All questions answered. Patient agrees with the plan of care.    Follow-up as needed if symptoms worsen or fail to improve to PCP, Urgent care or Emergency Room.    Please note that this dictation was created using voice recognition software. I have made a reasonable attempt to correct obvious errors, but I expect that there are errors of grammar and possibly content that I did not discover before finalizing the note.

## 2022-04-08 ENCOUNTER — OFFICE VISIT (OUTPATIENT)
Dept: URGENT CARE | Facility: CLINIC | Age: 42
End: 2022-04-08
Payer: MEDICAID

## 2022-04-08 VITALS
BODY MASS INDEX: 32.44 KG/M2 | OXYGEN SATURATION: 97 % | TEMPERATURE: 99 F | SYSTOLIC BLOOD PRESSURE: 124 MMHG | WEIGHT: 190 LBS | HEART RATE: 100 BPM | DIASTOLIC BLOOD PRESSURE: 68 MMHG | RESPIRATION RATE: 16 BRPM | HEIGHT: 64 IN

## 2022-04-08 DIAGNOSIS — R11.0 NAUSEA: ICD-10-CM

## 2022-04-08 DIAGNOSIS — A08.4 VIRAL GASTROENTERITIS: ICD-10-CM

## 2022-04-08 DIAGNOSIS — K04.7 DENTAL INFECTION: ICD-10-CM

## 2022-04-08 LAB
EXTERNAL QUALITY CONTROL: NORMAL
SARS-COV+SARS-COV-2 AG RESP QL IA.RAPID: NORMAL

## 2022-04-08 PROCEDURE — 87426 SARSCOV CORONAVIRUS AG IA: CPT | Performed by: NURSE PRACTITIONER

## 2022-04-08 PROCEDURE — 99213 OFFICE O/P EST LOW 20 MIN: CPT | Performed by: NURSE PRACTITIONER

## 2022-04-08 RX ORDER — AMOXICILLIN AND CLAVULANATE POTASSIUM 875; 125 MG/1; MG/1
1 TABLET, FILM COATED ORAL 2 TIMES DAILY
Qty: 20 TABLET | Refills: 0 | Status: SHIPPED | OUTPATIENT
Start: 2022-04-08 | End: 2022-04-18

## 2022-04-08 RX ORDER — ONDANSETRON 4 MG/1
4 TABLET, ORALLY DISINTEGRATING ORAL EVERY 6 HOURS PRN
Qty: 10 TABLET | Refills: 0 | Status: SHIPPED | OUTPATIENT
Start: 2022-04-08

## 2022-04-08 ASSESSMENT — ENCOUNTER SYMPTOMS
HEADACHES: 1
COUGH: 1
WHEEZING: 0
VOMITING: 1
BLOOD IN STOOL: 0
NAUSEA: 1
ABDOMINAL PAIN: 1
HEARTBURN: 0
SHORTNESS OF BREATH: 0
SORE THROAT: 0
DIARRHEA: 1
FEVER: 0

## 2022-04-08 ASSESSMENT — FIBROSIS 4 INDEX: FIB4 SCORE: 0.5

## 2022-04-08 NOTE — LETTER
April 8, 2022         Patient: Korina Lyons   YOB: 1980   Date of Visit: 4/8/2022           To Whom it May Concern:    Kroina Lyons was seen in my clinic on 4/8/2022. She may return to work on 04/11/2022.    If you have any questions or concerns, please don't hesitate to call.        Sincerely,           PH[facility] Bruno.  Electronically Signed

## 2022-04-08 NOTE — PATIENT INSTRUCTIONS
-Increase fluids.  -Rest.  -Advance diet as tolerated starting with bland, low fat meals. Boiled starches and cereals with salt are indicated in patients with watery diarrhea; crackers, bananas, soup, and boiled vegetables may also be consumed.   -Avoid alcohol, coffee, nicotine and spicy foods.  -Probiotics  -Follow up with PCP or clinic in 3 days if not feeling better.    Follow up emergently for more than 6 watery stools in a 24 hour period, blood or mucus in stool, fever greater than 101.2, inability to tolerated fluids, signs of dehydration, weakness, elevated heart rate, increased or persistent abdominal pain.    Viral Gastroenteritis, Adult    Viral gastroenteritis is also known as the stomach flu. This condition may affect your stomach, small intestine, and large intestine. It can cause sudden watery diarrhea, fever, and vomiting. This condition is caused by many different viruses. These viruses can be passed from person to person very easily (are contagious).  Diarrhea and vomiting can make you feel weak and cause you to become dehydrated. You may not be able to keep fluids down. Dehydration can make you tired and thirsty, cause you to have a dry mouth, and decrease how often you urinate. It is important to replace the fluids that you lose from diarrhea and vomiting.  What are the causes?  Gastroenteritis is caused by many viruses, including rotavirus and norovirus. Norovirus is the most common cause in adults. You can get sick after being exposed to the viruses from other people. You can also get sick by:  · Eating food, drinking water, or touching a surface contaminated with one of these viruses.  · Sharing utensils or other personal items with an infected person.  What increases the risk?  You are more likely to develop this condition if you:  · Have a weak body defense system (immune system).  · Live with one or more children who are younger than 2 years old.  · Live in a nursing home.  · Travel on  cruise ships.  What are the signs or symptoms?  Symptoms of this condition start suddenly 1-3 days after exposure to a virus. Symptoms may last for a few days or for as long as a week. Common symptoms include watery diarrhea and vomiting. Other symptoms include:  · Fever.  · Headache.  · Fatigue.  · Pain in the abdomen.  · Chills.  · Weakness.  · Nausea.  · Muscle aches.  · Loss of appetite.  How is this diagnosed?  This condition is diagnosed with a medical history and physical exam. You may also have a stool test to check for viruses or other infections.  How is this treated?  This condition typically goes away on its own. The focus of treatment is to prevent dehydration and restore lost fluids (rehydration). This condition may be treated with:  · An oral rehydration solution (ORS) to replace important salts and minerals (electrolytes) in your body. Take this if told by your health care provider. This is a drink that is sold at pharmacies and retail stores.  · Medicines to help with your symptoms.  · Probiotic supplements to reduce symptoms of diarrhea.  · Fluids given through an IV, if dehydration is severe.  Older adults and people with other diseases or a weak immune system are at higher risk for dehydration.  Follow these instructions at home:    Eating and drinking    · Take an ORS as told by your health care provider.  · Drink clear fluids in small amounts as you are able. Clear fluids include:  ? Water.  ? Ice chips.  ? Diluted fruit juice.  ? Low-calorie sports drinks.  · Drink enough fluid to keep your urine pale yellow.  · Eat small amounts of healthy foods every 3-4 hours as you are able. This may include whole grains, fruits, vegetables, lean meats, and yogurt.  · Avoid fluids that contain a lot of sugar or caffeine, such as energy drinks, sports drinks, and soda.  · Avoid spicy or fatty foods.  · Avoid alcohol.  General instructions  · Wash your hands often, especially after having diarrhea or  vomiting. If soap and water are not available, use hand .  · Make sure that all people in your household wash their hands well and often.  · Take over-the-counter and prescription medicines only as told by your health care provider.  · Rest at home while you recover.  · Watch your condition for any changes.  · Take a warm bath to relieve any burning or pain from frequent diarrhea episodes.  · Keep all follow-up visits as told by your health care provider. This is important.  Contact a health care provider if you:  · Cannot keep fluids down.  · Have symptoms that get worse.  · Have new symptoms.  · Feel light-headed or dizzy.  · Have muscle cramps.  Get help right away if you:  · Have chest pain.  · Feel extremely weak or you faint.  · See blood in your vomit.  · Have vomit that looks like coffee grounds.  · Have bloody or black stools or stools that look like tar.  · Have a severe headache, a stiff neck, or both.  · Have a rash.  · Have severe pain, cramping, or bloating in your abdomen.  · Have trouble breathing or you are breathing very quickly.  · Have a fast heartbeat.  · Have skin that feels cold and clammy.  · Feel confused.  · Have pain when you urinate.  · Have signs of dehydration, such as:  ? Dark urine, very little urine, or no urine.  ? Cracked lips.  ? Dry mouth.  ? Sunken eyes.  ? Sleepiness.  ? Weakness.  Summary  · Viral gastroenteritis is also known as the stomach flu. It can cause sudden watery diarrhea, fever, and vomiting.  · This condition can be passed from person to person very easily (is contagious).  · Take an ORS if told by your health care provider. This is a drink that is sold at pharmacies and retail stores.  · Wash your hands often, especially after having diarrhea or vomiting. If soap and water are not available, use hand .  This information is not intended to replace advice given to you by your health care provider. Make sure you discuss any questions you have with  your health care provider.  Document Released: 12/18/2006 Document Revised: 10/23/2019 Document Reviewed: 10/23/2019  Elsevier Patient Education © 2020 [employer]

## 2022-04-08 NOTE — PROGRESS NOTES
"  Subjective:     Korina Lyons is a 41 y.o. female who presents for Diarrhea (Vomiting/abdominal pain/x3days)      Presents for GI symptoms. Had lower abdominal pain, stating it is not that bad today. Symptoms are improving. Feels bloated and gassy, with nausea. Last vomited last night. Last BM this morning, \"back to normal\". Started after eating at [employer]. Work is requesting a COVID test.     Also has a loose tooth that's been infected.     Diarrhea   This is a new problem. The current episode started in the past 7 days. The problem has been gradually improving. Associated symptoms include abdominal pain, coughing, headaches and vomiting. Pertinent negatives include no fever.       Past Medical History:   Diagnosis Date   • Bipolar 1 disorder (HCC)    • Cancer (HCC) 2017    Cervical.   • Psychiatric problem     bipolar       Past Surgical History:   Procedure Laterality Date   • HYSTERECTOMY ROBOTIC XI  2/1/2018    Procedure: HYSTERECTOMY ROBOTIC XI- RADICAL;  Surgeon: iTto Jensen M.D.;  Location: SURGERY [facility];  Service: Gynecology   • SALPINGECTOMY Bilateral 2/1/2018    Procedure: SALPINGECTOMY;  Surgeon: Tito Jensen M.D.;  Location: SURGERY [facility];  Service: Gynecology   • OOPHORECTOMY Bilateral 2/1/2018    Procedure: OOPHOROPEXY;  Surgeon: Tito Jensen M.D.;  Location: [facility];  Service: Gynecology   • CYSTOSCOPY STENT PLACEMENT Bilateral 2/1/2018    Procedure: CYSTOSCOPY STENT PLACEMENT- URETERAL;  Surgeon: Tito Jensen M.D.;  Location: [facility];  Service: Gynecology   • DILATION AND CURETTAGE  12/17/2017    Procedure: BIOPSY OF CERVICAL MASS;  Surgeon: Denis Keating M.D.;  Location: [facility];  Service: Gynecology       Social History     Socioeconomic History   • Marital status: Single     Spouse name: Not on file   • Number of children: Not on file   • Years of education: Not on file   • Highest education level: Associate " degree: occupational, technical, or vocational program   Occupational History   • Not on file   Tobacco Use   • Smoking status: Former Smoker     Packs/day: 0.10     Years: 27.00     Pack years: 2.70     Types: Cigarettes     Quit date: 2018     Years since quittin.2   • Smokeless tobacco: Never Used   Vaping Use   • Vaping Use: Every day   • Substances: Nicotine   • Devices: Pre-filled or refillable cartridge   Substance and Sexual Activity   • Alcohol use: Not Currently     Comment: occ   • Drug use: Not Currently     Types: Inhaled, Marijuana     Comment: Marijuana daily.    • Sexual activity: Yes     Partners: Male   Other Topics Concern   • Not on file   Social History Narrative   • Not on file     Social Determinants of Health     Financial Resource Strain: Low Risk    • Difficulty of Paying Living Expenses: Not hard at all   Food Insecurity: No Food Insecurity   • Worried About Running Out of Food in the Last Year: Never true   • Ran Out of Food in the Last Year: Never true   Transportation Needs: No Transportation Needs   • Lack of Transportation (Medical): No   • Lack of Transportation (Non-Medical): No   Physical Activity: Sufficiently Active   • Days of Exercise per Week: 7 days   • Minutes of Exercise per Session: 30 min   Stress: No Stress Concern Present   • Feeling of Stress : Not at all   Social Connections: Socially Isolated   • Frequency of Communication with Friends and Family: More than three times a week   • Frequency of Social Gatherings with Friends and Family: More than three times a week   • Attends [employer] Services: Never   • Active Member of Clubs or Organizations: No   • Attends Club or Organization Meetings: Never   • Marital Status: Never    Intimate Partner Violence: Not on file   Housing Stability: Low Risk    • Unable to Pay for Housing in the Last Year: No   • Number of Places Lived in the Last Year: 1   • Unstable Housing in the Last Year: No        Family History  "  Problem Relation Age of Onset   • Cancer Maternal Aunt         ovary   • Cancer Maternal Uncle         ?   • Genetic Disorder Maternal Grandfather         lung   • Cancer Maternal Grandfather         lung        Allergies   Allergen Reactions   • Seroquel [Quetiapine Fumarate]      Restless leg         Review of Systems   Constitutional: Positive for malaise/fatigue. Negative for fever.   HENT: Negative for congestion and sore throat.    Respiratory: Positive for cough. Negative for shortness of breath and wheezing.    Gastrointestinal: Positive for abdominal pain, diarrhea, nausea and vomiting. Negative for blood in stool and heartburn.   Neurological: Positive for headaches.   All other systems reviewed and are negative.       Objective:   /68 (BP Location: Left arm, Patient Position: Sitting)   Pulse 100   Temp 37.2 °C (99 °F) (Temporal)   Resp 16   Ht 1.626 m (5' 4\")   Wt 86.2 kg (190 lb)   LMP 01/20/2018   SpO2 97%   BMI 32.61 kg/m²     Physical Exam  Vitals reviewed.   Constitutional:       General: She is not in acute distress.     Appearance: She is well-developed. She is not toxic-appearing.   HENT:      Head: Normocephalic and atraumatic.      Right Ear: External ear normal.      Left Ear: External ear normal.      Nose: Nose normal.      Mouth/Throat:      Lips: Pink.      Mouth: Mucous membranes are moist.      Dentition: Abnormal dentition. Dental tenderness and dental caries present.      Pharynx: Oropharynx is clear.        Comments: Gum erosion.   Eyes:      Conjunctiva/sclera: Conjunctivae normal.   Cardiovascular:      Rate and Rhythm: Normal rate.   Pulmonary:      Effort: Pulmonary effort is normal. No respiratory distress.      Breath sounds: Normal breath sounds.   Abdominal:      General: Bowel sounds are normal. There is no distension.      Palpations: Abdomen is soft.      Tenderness: There is generalized abdominal tenderness. There is no guarding.      Comments: Mild " generalized TTP.    Musculoskeletal:         General: Normal range of motion.      Cervical back: Normal range of motion.   Skin:     General: Skin is warm and dry.      Findings: No rash.   Neurological:      General: No focal deficit present.      Mental Status: She is alert and oriented to person, place, and time.      GCS: GCS eye subscore is 4. GCS verbal subscore is 5. GCS motor subscore is 6.   Psychiatric:         Mood and Affect: Mood normal.         Speech: Speech normal.         Behavior: Behavior normal.         Thought Content: Thought content normal.         Judgment: Judgment normal.         Assessment/Plan:     1. Viral gastroenteritis    2. Nausea  - ondansetron (ZOFRAN ODT) 4 MG TABLET DISPERSIBLE; Take 1 Tablet by mouth every 6 hours as needed for Nausea.  Dispense: 10 Tablet; Refill: 0  - POCT SARS-COV Antigen PRESTON (Symptomatic only)    3. Dental infection  - amoxicillin-clavulanate (AUGMENTIN) 875-125 MG Tab; Take 1 Tablet by mouth 2 times a day for 10 days.  Dispense: 20 Tablet; Refill: 0  -Increase fluids.  -Rest.  -Advance diet as tolerated starting with bland, low fat meals. Boiled starches and cereals with salt are indicated in patients with watery diarrhea; crackers, bananas, soup, and boiled vegetables may also be consumed.   -Avoid alcohol, coffee, nicotine and spicy foods.  -Probiotics  -Follow up with PCP or clinic in 3 days if not feeling better.    Follow up emergently for more than 6 watery stools in a 24 hour period, blood or mucus in stool, fever greater than 101.2, inability to tolerated fluids, signs of dehydration, weakness, elevated heart rate, increased or persistent abdominal pain.    Has dental appointment in May. Stable vitals. Reports symptoms improving. Generalized mild abdominal TTP on exam. Given ER precautions for increased or persistent abdominal pain.     Differential diagnosis, natural history, supportive care, and indications for immediate follow-up discussed.

## 2022-04-25 ENCOUNTER — OFFICE VISIT (OUTPATIENT)
Dept: URGENT CARE | Facility: CLINIC | Age: 42
End: 2022-04-25
Payer: MEDICAID

## 2022-04-25 VITALS
WEIGHT: 195.6 LBS | SYSTOLIC BLOOD PRESSURE: 126 MMHG | OXYGEN SATURATION: 98 % | DIASTOLIC BLOOD PRESSURE: 80 MMHG | BODY MASS INDEX: 33.39 KG/M2 | TEMPERATURE: 98 F | RESPIRATION RATE: 16 BRPM | HEART RATE: 89 BPM | HEIGHT: 64 IN

## 2022-04-25 DIAGNOSIS — R05.9 COUGH: ICD-10-CM

## 2022-04-25 DIAGNOSIS — J02.9 SORE THROAT: ICD-10-CM

## 2022-04-25 DIAGNOSIS — J06.9 UPPER RESPIRATORY TRACT INFECTION, UNSPECIFIED TYPE: ICD-10-CM

## 2022-04-25 PROCEDURE — 99212 OFFICE O/P EST SF 10 MIN: CPT | Performed by: PHYSICIAN ASSISTANT

## 2022-04-25 ASSESSMENT — FIBROSIS 4 INDEX: FIB4 SCORE: 0.5

## 2022-04-25 NOTE — LETTER
April 25, 2022    To Whom It May Concern:         This is confirmation that Korina Lyons attended her scheduled appointment with Alta Winchester P.A.-C. on 4/25/22. Please excuse patient from work today.  She is able to return to work tomorrow.         If you have any questions please do not hesitate to call me at the phone number listed below.    Sincerely,          Alta Winchester P.A.-C.  [phone]

## 2022-04-25 NOTE — PROGRESS NOTES
Subjective:   Korina Lyons is a 41 y.o. female who presents for Pharyngitis (Cough, congestion, sneezing, running nose x 5 days)     Started as sore throat, then symptoms as above.  Eating and drinking ok.  Fevers or chills.  Does have concomitant seasonal allergies.  Daughter seen for similar symptoms.  No myalgias.      Medications:  hydrOXYzine HCl Tabs  ondansetron Tbdp    Allergies:             Seroquel [quetiapine fumarate]    Surgical History:         Past Surgical History:   Procedure Laterality Date   • HYSTERECTOMY ROBOTIC XI  2/1/2018    Procedure: HYSTERECTOMY ROBOTIC XI- RADICAL;  Surgeon: Tito Jensen M.D.;  Location: SURGERY [facility];  Service: Gynecology   • SALPINGECTOMY Bilateral 2/1/2018    Procedure: SALPINGECTOMY;  Surgeon: Tito Jensen M.D.;  Location: SURGERY [facility];  Service: Gynecology   • OOPHORECTOMY Bilateral 2/1/2018    Procedure: OOPHOROPEXY;  Surgeon: Tito Jensen M.D.;  Location: SURGERY [facility];  Service: Gynecology   • CYSTOSCOPY STENT PLACEMENT Bilateral 2/1/2018    Procedure: CYSTOSCOPY STENT PLACEMENT- URETERAL;  Surgeon: Tito Jensen M.D.;  Location: SURGERY [facility];  Service: Gynecology   • DILATION AND CURETTAGE  12/17/2017    Procedure: BIOPSY OF CERVICAL MASS;  Surgeon: Denis Keating M.D.;  Location: SURGERY [facility];  Service: Gynecology       Past Social Hx:  Korina Lyons  reports that she quit smoking about 4 years ago. Her smoking use included cigarettes. She has a 2.70 pack-year smoking history. She has never used smokeless tobacco. She reports previous alcohol use. She reports previous drug use. Drugs: Inhaled and Marijuana.     Past Family Hx:   Korina Lyons family history includes Cancer in her maternal aunt, maternal grandfather, and maternal uncle; Genetic Disorder in her maternal grandfather.       Problem list, medications, and allergies reviewed by myself today in Epic.     Objective:     BP  "126/80 (BP Location: Left arm, Patient Position: Sitting, BP Cuff Size: Adult)   Pulse 89   Temp 36.7 °C (98 °F) (Temporal)   Resp 16   Ht 1.626 m (5' 4\")   Wt 88.7 kg (195 lb 9.6 oz)   LMP 01/20/2018   SpO2 98%   BMI 33.57 kg/m²     Physical Exam  Vitals and nursing note reviewed.   Constitutional:       General: She is not in acute distress.     Appearance: Normal appearance.   HENT:      Head: Normocephalic.      Jaw: No trismus.      Right Ear: Tympanic membrane is not erythematous. Tympanic membrane has decreased mobility.      Left Ear: Tympanic membrane is not erythematous. Tympanic membrane has decreased mobility.      Nose: Congestion and rhinorrhea present.      Mouth/Throat:      Mouth: Mucous membranes are moist.      Palate: No lesions.      Pharynx: Uvula midline. Posterior oropharyngeal erythema present. No oropharyngeal exudate.      Tonsils: No tonsillar exudate.   Eyes:      Extraocular Movements: Extraocular movements intact.   Cardiovascular:      Rate and Rhythm: Normal rate and regular rhythm.      Pulses: Normal pulses.      Heart sounds: Normal heart sounds.   Pulmonary:      Effort: Pulmonary effort is normal. No accessory muscle usage or respiratory distress.      Breath sounds: Normal breath sounds. No stridor or decreased air movement. No wheezing.   Abdominal:      Tenderness: There is no abdominal tenderness.   Musculoskeletal:      Cervical back: Normal range of motion.   Lymphadenopathy:      Cervical: No cervical adenopathy.   Skin:     Findings: No rash.   Neurological:      Mental Status: She is alert.   Psychiatric:         Behavior: Behavior is cooperative.       Rapid strep negative  Assessment/Plan:     Diagnosis and Associated Orders:     1. Cough    2. Sore throat    3. Upper respiratory tract infection, unspecified type  - POCT SARS-COV Antigen PRESTON Manual Result        Comments/MDM:    The patient's presenting symptoms and exam findings most likely are due to a viral " etiology.     Symptomatic and supportive care:   Plenty of oral hydration and rest   Over the counter cough suppressant as directed.  Tylenol or ibuprofen for pain and fever as directed.   Warm salt water gargles for sore throat, soft foods, cool liquids.   Saline nasal spray, Flonase, and/or otc sudafed (if no history of hypertension) as a decongestant.   Infection control measures at home. Stay away from people, Hand washing, covering sneeze/cough, disinfect surfaces.   Remain home from work, school, and other populated environments while ill.  Overall, the patient is well-appearing. They are not hypoxic, afebrile, and a normal pulmonary exam.    Patient declined COVID testing.  Work note provided.  Return precautions are discussed..     I personally reviewed prior external notes and test results pertinent to today's visit.  Red flags discussed as well as indications to present to the Emergency Department.  Supportive care, natural history, differential diagnoses, and indications for immediate follow-up discussed.  Patient expresses understanding and agrees to plan.  Patient denies any other questions or concerns.    Follow-up with the primary care physician for recheck, reevaluation, and consideration of further management.      Please note that this dictation was created using voice recognition software. I have made a reasonable attempt to correct obvious errors, but I expect that there are errors of grammar and possibly content that I did not discover before finalizing the note.    This note was electronically signed by Alta Winchester PA-C

## 2023-08-09 ENCOUNTER — HOSPITAL ENCOUNTER (OUTPATIENT)
Dept: RADIOLOGY | Facility: MEDICAL CENTER | Age: 43
End: 2023-08-09
Attending: FAMILY MEDICINE
Payer: MEDICAID

## 2024-07-31 NOTE — ED NOTES
Pt to [facility]. Told to come to ED by  for concern for covid symptoms. Pt reports only HA and nausea. She has not cough fever or SOB. Her work is requesting a covid test prior to her returning to work.    TRANSFER

## (undated) DEVICE — GLOVE BIOGEL PI INDICATOR SZ 6.5 SURGICAL PF LF - (50/BX 4BX/CA)

## (undated) DEVICE — SET EXTENSION WITH 2 PORTS (48EA/CA) ***PART #2C8610 IS A SUBSTITUTE*****

## (undated) DEVICE — NEEDLE SPINAL NON-SAFETY 18 GA X 3 IN (25EA/BX)

## (undated) DEVICE — GOWN SURGEONS X-LARGE - DISP. (30/CA)

## (undated) DEVICE — GLOVE BIOGEL SZ 6.5 SURGICAL PF LTX (50PR/BX 4BX/CA)

## (undated) DEVICE — SET SUCTION/IRRIGATION WITH DISPOSABLE TIP (6/CA )PART #0250-070-520 IS A SUB

## (undated) DEVICE — LACTATED RINGERS INJ 1000 ML - (14EA/CA 60CA/PF)

## (undated) DEVICE — SUCTION INSTRUMENT YANKAUER BULBOUS TIP W/O VENT (50EA/CA)

## (undated) DEVICE — GOWN WARMING STANDARD FLEX - (30/CA)

## (undated) DEVICE — SODIUM CHL IRRIGATION 0.9% 1000ML (12EA/CA)

## (undated) DEVICE — ARMREST CRADLE FOAM - (2PR/PK 12PR/CA)

## (undated) DEVICE — NEPTUNE 4 PORT MANIFOLD - (20/PK)

## (undated) DEVICE — WATER IRRIG. STER. 1500 ML - (9/CA)

## (undated) DEVICE — SLEEVE, VASO, THIGH, MED

## (undated) DEVICE — PROTECTOR ULNA NERVE - (36PR/CA)

## (undated) DEVICE — GLOVE BIOGEL SZ 8.5 SURGICAL PF LTX - (50PR/BX 4BX/CA)

## (undated) DEVICE — SUTURE 2-0 VICRYL PLUS SH - 27 INCH (36/BX)

## (undated) DEVICE — SUTURE QUILL 0 PDO 14X14 - (12/BX)

## (undated) DEVICE — ELECTRODE 850 FOAM ADHESIVE - HYDROGEL RADIOTRNSPRNT (50/PK)

## (undated) DEVICE — GLOVE SZ 6.5 BIOGEL PI MICRO - PF LF (50PR/BX)

## (undated) DEVICE — BAG RETRIEVAL 5MM (10EA/BX)

## (undated) DEVICE — MASK ANESTHESIA ADULT  - (100/CA)

## (undated) DEVICE — GLOVE BIOGEL SZ 7.5 SURGICAL PF LTX - (50PR/BX 4BX/CA)

## (undated) DEVICE — TUBING CLEARLINK DUO-VENT - C-FLO (48EA/CA)

## (undated) DEVICE — CLEANER ELECTRO-SURGICAL TIP - (25/BX 4BX/CA)

## (undated) DEVICE — SUTURE 3-0 MONOCRYL PLUS PS-1 - 27 INCH (36/BX)

## (undated) DEVICE — DRAPE COLUMN  BOX OF 20

## (undated) DEVICE — JELLY, KY 2 0Z STERILE

## (undated) DEVICE — PAD OR TABLE DA VINCI 2IN X 20IN X 72IN - (12EA/CA)

## (undated) DEVICE — TUBE CONNECTING SUCTION - CLEAR PLASTIC STERILE 72 IN (50EA/CA)

## (undated) DEVICE — PAD SANITARY 11IN MAXI IND WRAPPED  (12EA/PK 24PK/CA)

## (undated) DEVICE — KIT ANESTHESIA W/CIRCUIT & 3/LT BAG W/FILTER (20EA/CA)

## (undated) DEVICE — FORCEPS PROGRASP DA VINCI 10X'S REUSABLE

## (undated) DEVICE — SEALER VESSEL DA VINCI XI (6EA/CA)

## (undated) DEVICE — Device

## (undated) DEVICE — TUBE E-T HI-LO CUFF 7.5MM (10EA/PK)

## (undated) DEVICE — GLOVE BIOGEL PI ORTHO SZ 7 PF LF (40PR/BX)

## (undated) DEVICE — CLIP HEMOLOCK PURPLE - (14/BX)

## (undated) DEVICE — NEEDLE INSUFFLATION FOR STEP - (12/BX)

## (undated) DEVICE — TROCAR Z THREAD 12 X 100 - BLADED (6/BX)

## (undated) DEVICE — SPATULA PERMANENT CAUTERY DA VINCI 10X'S REUSABLE

## (undated) DEVICE — SEAL 5MM-8MM UNIVERSAL  BOX OF 10

## (undated) DEVICE — GOWN SURGICAL X-LARGE ULTRA - FILM-REINFORCED (20/CA)

## (undated) DEVICE — SET LEADWIRE 5 LEAD BEDSIDE DISPOSABLE ECG (1SET OF 5/EA)

## (undated) DEVICE — KIT ROOM DECONTAMINATION

## (undated) DEVICE — NEEDLE DRIVER LARGE DA VINCI 10X'S REUSABLE

## (undated) DEVICE — PACK GYN DAVINCI (2EA/CA)

## (undated) DEVICE — CANISTER SUCTION 3000ML MECHANICAL FILTER AUTO SHUTOFF MEDI-VAC NONSTERILE LF DISP  (40EA/CA)

## (undated) DEVICE — SPONGE GAUZESTER 4 X 4 4PLY - (128PK/CA)

## (undated) DEVICE — ELECTRODE DUAL RETURN W/ CORD - (50/PK)

## (undated) DEVICE — HEAD HOLDER JUNIOR/ADULT

## (undated) DEVICE — PACK CYSTOSCOPY - (14/CA)

## (undated) DEVICE — TRAY SRGPRP PVP IOD WT PRP - (20/CA)

## (undated) DEVICE — BRIEF STRETCH MATERNITY M/L - FITS 20-60IN (5EA/BG 20BG/CA)

## (undated) DEVICE — ELECTRODE NEEDLE NON-SAFETY EXTENDED 6 (50EA/PK)"

## (undated) DEVICE — SYRINGE ASEPTO - (50EA/CA

## (undated) DEVICE — SYRINGE 30 ML LS (56/BX)

## (undated) DEVICE — SENSOR SPO2 NEO LNCS ADHESIVE (20/BX) SEE USER NOTES

## (undated) DEVICE — GLOVE BIOGEL PI INDICATOR SZ 8.0 SURGICAL PF LF -(50/BX 4BX/CA)

## (undated) DEVICE — TUBE CONNECT SUCTION CLEAR 120 X 1/4" (50EA/CA)"

## (undated) DEVICE — PENCIL ELECTSURG 10FT BTN SWH - (50/CA)

## (undated) DEVICE — SUTURE GENERAL

## (undated) DEVICE — WATER IRRIGATION STERILE 1000ML (12EA/CA)

## (undated) DEVICE — ROBOTIC SURGERY SERVICES

## (undated) DEVICE — SET IRRIGATION CYSTOSCOPY TUBE L80 IN (20EA/CA)

## (undated) DEVICE — DRAPE ARM  BOX OF 20

## (undated) DEVICE — TOWELS CLOTH SURGICAL - (4/PK 20PK/CA)

## (undated) DEVICE — SUTURE 0 VICRYL PLUS CT-2 - 27 INCH (36/BX)

## (undated) DEVICE — WIRE GUIDE .038 X 150 FLEX - .

## (undated) DEVICE — SYRINGE DISP. 12 CC LL - (100/BX)

## (undated) DEVICE — FORCEPS MARYLAND BIPOLAR DA VINCI 10X'S REUSABLE

## (undated) DEVICE — DRESSING NON ADHERENT 3 X 4 - STERILE (100/BX 12BX/CA)

## (undated) DEVICE — CLIP SM INTNL HRZN TI ESCP LGT - (24EA/PK 25PK/BX)

## (undated) DEVICE — SYRINGE 12 CC LUER TIP - (80/BX) OBSOLETE ITEM

## (undated) DEVICE — NEEDLE DRIVER MEGA SUTURECUT DA VINCI 15X'S REUSABLE